# Patient Record
Sex: MALE | Race: BLACK OR AFRICAN AMERICAN | Employment: OTHER | URBAN - METROPOLITAN AREA
[De-identification: names, ages, dates, MRNs, and addresses within clinical notes are randomized per-mention and may not be internally consistent; named-entity substitution may affect disease eponyms.]

---

## 2021-08-26 ENCOUNTER — APPOINTMENT (OUTPATIENT)
Dept: GENERAL RADIOLOGY | Age: 61
DRG: 192 | End: 2021-08-26
Payer: COMMERCIAL

## 2021-08-26 ENCOUNTER — HOSPITAL ENCOUNTER (INPATIENT)
Age: 61
LOS: 4 days | Discharge: LEFT BEFORE DISCHARGE | DRG: 192 | End: 2021-08-30
Attending: EMERGENCY MEDICINE | Admitting: INTERNAL MEDICINE
Payer: COMMERCIAL

## 2021-08-26 DIAGNOSIS — R10.31 GROIN PAIN, RIGHT: ICD-10-CM

## 2021-08-26 DIAGNOSIS — J81.0 ACUTE PULMONARY EDEMA (HCC): Primary | ICD-10-CM

## 2021-08-26 PROBLEM — I50.9 CHF WITH UNKNOWN LVEF (HCC): Status: ACTIVE | Noted: 2021-08-26

## 2021-08-26 LAB
AMPHETAMINE SCREEN, URINE: ABNORMAL
ANION GAP SERPL CALCULATED.3IONS-SCNC: 12 MMOL/L (ref 3–16)
BARBITURATE SCREEN URINE: ABNORMAL
BASE EXCESS VENOUS: 0.3 MMOL/L (ref -2–3)
BASOPHILS ABSOLUTE: 0.1 K/UL (ref 0–0.2)
BASOPHILS RELATIVE PERCENT: 1.4 %
BENZODIAZEPINE SCREEN, URINE: ABNORMAL
BUN BLDV-MCNC: 12 MG/DL (ref 7–20)
CALCIUM SERPL-MCNC: 9.4 MG/DL (ref 8.3–10.6)
CANNABINOID SCREEN URINE: POSITIVE
CARBOXYHEMOGLOBIN: 2.1 % (ref 0–1.5)
CHLORIDE BLD-SCNC: 107 MMOL/L (ref 99–110)
CO2: 20 MMOL/L (ref 21–32)
COCAINE METABOLITE SCREEN URINE: POSITIVE
CREAT SERPL-MCNC: 0.8 MG/DL (ref 0.9–1.3)
EKG ATRIAL RATE: 92 BPM
EKG DIAGNOSIS: NORMAL
EKG P AXIS: 44 DEGREES
EKG P-R INTERVAL: 166 MS
EKG Q-T INTERVAL: 368 MS
EKG QRS DURATION: 92 MS
EKG QTC CALCULATION (BAZETT): 455 MS
EKG R AXIS: -14 DEGREES
EKG T AXIS: 63 DEGREES
EKG VENTRICULAR RATE: 92 BPM
EOSINOPHILS ABSOLUTE: 0.2 K/UL (ref 0–0.6)
EOSINOPHILS RELATIVE PERCENT: 2.9 %
GFR AFRICAN AMERICAN: >60
GFR NON-AFRICAN AMERICAN: >60
GLUCOSE BLD-MCNC: 105 MG/DL (ref 70–99)
HCO3 VENOUS: 24.4 MMOL/L (ref 24–28)
HCT VFR BLD CALC: 35.7 % (ref 40.5–52.5)
HEMOGLOBIN, VEN, REDUCED: 9.4 %
HEMOGLOBIN: 12.5 G/DL (ref 13.5–17.5)
LV EF: 25 %
LVEF MODALITY: NORMAL
LYMPHOCYTES ABSOLUTE: 1.6 K/UL (ref 1–5.1)
LYMPHOCYTES RELATIVE PERCENT: 29.8 %
Lab: ABNORMAL
MAGNESIUM: 1.9 MG/DL (ref 1.8–2.4)
MCH RBC QN AUTO: 30.5 PG (ref 26–34)
MCHC RBC AUTO-ENTMCNC: 34.9 G/DL (ref 31–36)
MCV RBC AUTO: 87.6 FL (ref 80–100)
METHADONE SCREEN, URINE: ABNORMAL
METHEMOGLOBIN VENOUS: 0.1 % (ref 0–1.5)
MONOCYTES ABSOLUTE: 0.4 K/UL (ref 0–1.3)
MONOCYTES RELATIVE PERCENT: 7.3 %
NEUTROPHILS ABSOLUTE: 3.1 K/UL (ref 1.7–7.7)
NEUTROPHILS RELATIVE PERCENT: 58.6 %
O2 SAT, VEN: 90 %
OPIATE SCREEN URINE: ABNORMAL
OXYCODONE URINE: ABNORMAL
PCO2, VEN: 36.5 MMHG (ref 41–51)
PDW BLD-RTO: 12.9 % (ref 12.4–15.4)
PH UA: 5
PH VENOUS: 7.43 (ref 7.35–7.45)
PHENCYCLIDINE SCREEN URINE: ABNORMAL
PHOSPHORUS: 2.9 MG/DL (ref 2.5–4.9)
PLATELET # BLD: 247 K/UL (ref 135–450)
PMV BLD AUTO: 8.8 FL (ref 5–10.5)
PO2, VEN: 57.4 MMHG (ref 25–40)
POTASSIUM SERPL-SCNC: 4 MMOL/L (ref 3.5–5.1)
PRO-BNP: 2012 PG/ML (ref 0–124)
PROPOXYPHENE SCREEN: ABNORMAL
RBC # BLD: 4.08 M/UL (ref 4.2–5.9)
SODIUM BLD-SCNC: 139 MMOL/L (ref 136–145)
TCO2 CALC VENOUS: 26 MMOL/L
TROPONIN: 0.01 NG/ML
TROPONIN: 0.01 NG/ML
WBC # BLD: 5.3 K/UL (ref 4–11)

## 2021-08-26 PROCEDURE — 85025 COMPLETE CBC W/AUTO DIFF WBC: CPT

## 2021-08-26 PROCEDURE — 1200000000 HC SEMI PRIVATE

## 2021-08-26 PROCEDURE — 99255 IP/OBS CONSLTJ NEW/EST HI 80: CPT | Performed by: INTERNAL MEDICINE

## 2021-08-26 PROCEDURE — 99284 EMERGENCY DEPT VISIT MOD MDM: CPT

## 2021-08-26 PROCEDURE — 83735 ASSAY OF MAGNESIUM: CPT

## 2021-08-26 PROCEDURE — 96374 THER/PROPH/DIAG INJ IV PUSH: CPT

## 2021-08-26 PROCEDURE — 6360000002 HC RX W HCPCS: Performed by: INTERNAL MEDICINE

## 2021-08-26 PROCEDURE — 80048 BASIC METABOLIC PNL TOTAL CA: CPT

## 2021-08-26 PROCEDURE — 84484 ASSAY OF TROPONIN QUANT: CPT

## 2021-08-26 PROCEDURE — 84100 ASSAY OF PHOSPHORUS: CPT

## 2021-08-26 PROCEDURE — 6360000002 HC RX W HCPCS: Performed by: EMERGENCY MEDICINE

## 2021-08-26 PROCEDURE — 83880 ASSAY OF NATRIURETIC PEPTIDE: CPT

## 2021-08-26 PROCEDURE — 80307 DRUG TEST PRSMV CHEM ANLYZR: CPT

## 2021-08-26 PROCEDURE — 93005 ELECTROCARDIOGRAM TRACING: CPT | Performed by: EMERGENCY MEDICINE

## 2021-08-26 PROCEDURE — C8929 TTE W OR WO FOL WCON,DOPPLER: HCPCS

## 2021-08-26 PROCEDURE — 6370000000 HC RX 637 (ALT 250 FOR IP): Performed by: INTERNAL MEDICINE

## 2021-08-26 PROCEDURE — 82803 BLOOD GASES ANY COMBINATION: CPT

## 2021-08-26 PROCEDURE — 2580000003 HC RX 258: Performed by: INTERNAL MEDICINE

## 2021-08-26 PROCEDURE — 71045 X-RAY EXAM CHEST 1 VIEW: CPT

## 2021-08-26 RX ORDER — FUROSEMIDE 10 MG/ML
40 INJECTION INTRAMUSCULAR; INTRAVENOUS ONCE
Status: COMPLETED | OUTPATIENT
Start: 2021-08-26 | End: 2021-08-26

## 2021-08-26 RX ORDER — POLYETHYLENE GLYCOL 3350 17 G/17G
17 POWDER, FOR SOLUTION ORAL DAILY PRN
Status: DISCONTINUED | OUTPATIENT
Start: 2021-08-26 | End: 2021-08-30 | Stop reason: HOSPADM

## 2021-08-26 RX ORDER — SODIUM CHLORIDE 9 MG/ML
25 INJECTION, SOLUTION INTRAVENOUS PRN
Status: DISCONTINUED | OUTPATIENT
Start: 2021-08-26 | End: 2021-08-30 | Stop reason: HOSPADM

## 2021-08-26 RX ORDER — ASPIRIN 81 MG/1
81 TABLET, CHEWABLE ORAL DAILY
Status: DISCONTINUED | OUTPATIENT
Start: 2021-08-26 | End: 2021-08-30 | Stop reason: HOSPADM

## 2021-08-26 RX ORDER — ACETAMINOPHEN 650 MG/1
650 SUPPOSITORY RECTAL EVERY 4 HOURS PRN
Status: DISCONTINUED | OUTPATIENT
Start: 2021-08-26 | End: 2021-08-30 | Stop reason: HOSPADM

## 2021-08-26 RX ORDER — FUROSEMIDE 10 MG/ML
40 INJECTION INTRAMUSCULAR; INTRAVENOUS 2 TIMES DAILY
Status: DISCONTINUED | OUTPATIENT
Start: 2021-08-26 | End: 2021-08-30

## 2021-08-26 RX ORDER — ONDANSETRON 2 MG/ML
4 INJECTION INTRAMUSCULAR; INTRAVENOUS EVERY 4 HOURS PRN
Status: DISCONTINUED | OUTPATIENT
Start: 2021-08-26 | End: 2021-08-30 | Stop reason: HOSPADM

## 2021-08-26 RX ORDER — ACETAMINOPHEN 325 MG/1
650 TABLET ORAL EVERY 4 HOURS PRN
Status: DISCONTINUED | OUTPATIENT
Start: 2021-08-26 | End: 2021-08-27

## 2021-08-26 RX ORDER — SODIUM CHLORIDE 0.9 % (FLUSH) 0.9 %
10 SYRINGE (ML) INJECTION EVERY 12 HOURS SCHEDULED
Status: DISCONTINUED | OUTPATIENT
Start: 2021-08-26 | End: 2021-08-30 | Stop reason: HOSPADM

## 2021-08-26 RX ORDER — KETOROLAC TROMETHAMINE 30 MG/ML
30 INJECTION, SOLUTION INTRAMUSCULAR; INTRAVENOUS ONCE
Status: COMPLETED | OUTPATIENT
Start: 2021-08-26 | End: 2021-08-26

## 2021-08-26 RX ORDER — SODIUM CHLORIDE 0.9 % (FLUSH) 0.9 %
10 SYRINGE (ML) INJECTION PRN
Status: DISCONTINUED | OUTPATIENT
Start: 2021-08-26 | End: 2021-08-30 | Stop reason: HOSPADM

## 2021-08-26 RX ADMIN — KETOROLAC TROMETHAMINE 30 MG: 30 INJECTION, SOLUTION INTRAMUSCULAR; INTRAVENOUS at 23:19

## 2021-08-26 RX ADMIN — FUROSEMIDE 40 MG: 10 INJECTION, SOLUTION INTRAMUSCULAR; INTRAVENOUS at 17:34

## 2021-08-26 RX ADMIN — Medication 10 ML: at 23:19

## 2021-08-26 RX ADMIN — FUROSEMIDE 40 MG: 10 INJECTION, SOLUTION INTRAMUSCULAR; INTRAVENOUS at 05:11

## 2021-08-26 RX ADMIN — FUROSEMIDE 40 MG: 10 INJECTION, SOLUTION INTRAMUSCULAR; INTRAVENOUS at 10:04

## 2021-08-26 RX ADMIN — ASPIRIN 81 MG: 81 TABLET, CHEWABLE ORAL at 13:03

## 2021-08-26 RX ADMIN — ENOXAPARIN SODIUM 40 MG: 40 INJECTION SUBCUTANEOUS at 10:04

## 2021-08-26 ASSESSMENT — PAIN DESCRIPTION - FREQUENCY
FREQUENCY: INTERMITTENT
FREQUENCY: CONTINUOUS
FREQUENCY: CONTINUOUS

## 2021-08-26 ASSESSMENT — ENCOUNTER SYMPTOMS
EYES NEGATIVE: 1
SHORTNESS OF BREATH: 1
COUGH: 1
GASTROINTESTINAL NEGATIVE: 1

## 2021-08-26 ASSESSMENT — PAIN DESCRIPTION - DESCRIPTORS
DESCRIPTORS: ACHING
DESCRIPTORS: ACHING
DESCRIPTORS: TIGHTNESS

## 2021-08-26 ASSESSMENT — PAIN DESCRIPTION - LOCATION
LOCATION: CHEST;SHOULDER
LOCATION: CHEST;SHOULDER
LOCATION: CHEST

## 2021-08-26 ASSESSMENT — PAIN DESCRIPTION - PAIN TYPE
TYPE: ACUTE PAIN

## 2021-08-26 ASSESSMENT — PAIN SCALES - GENERAL
PAINLEVEL_OUTOF10: 0
PAINLEVEL_OUTOF10: 7
PAINLEVEL_OUTOF10: 0

## 2021-08-26 ASSESSMENT — PAIN DESCRIPTION - ORIENTATION
ORIENTATION: LEFT
ORIENTATION: LEFT

## 2021-08-26 ASSESSMENT — PAIN DESCRIPTION - ONSET
ONSET: SUDDEN
ONSET: ON-GOING
ONSET: ON-GOING

## 2021-08-26 ASSESSMENT — PAIN - FUNCTIONAL ASSESSMENT
PAIN_FUNCTIONAL_ASSESSMENT: ACTIVITIES ARE NOT PREVENTED
PAIN_FUNCTIONAL_ASSESSMENT: ACTIVITIES ARE NOT PREVENTED

## 2021-08-26 ASSESSMENT — PAIN DESCRIPTION - PROGRESSION
CLINICAL_PROGRESSION: NOT CHANGED

## 2021-08-26 NOTE — H&P
HOSPITALISTS HISTORY AND PHYSICAL    8/26/2021 11:14 AM    Patient Information:  Eric Armstrong is a 62 y.o. male [id number]  PCP:  No primary care provider on file. (Tel: None )    Chief complaint:    Chief Complaint   Patient presents with    Chest Pain     ABOUT 0150 THIS MORNING, patient woke up to use the bathroom and had chest tightness rates a 7 of 10. when laying flat it gets worse. sitting up makes it better. reports SOB, and increased frequency with urination. Denies coughing or fever. Problem List  Active Problems:    CHF with unknown LVEF ([facility] [address]  Resolved Problems:    * No resolved hospital problems. *        Assessment/Plan:   Acute heart failure probably systolic in nature  IV Lasix to continue, repeat echo, cardiology consultation, monitor on telemetry, troponins are negative    Chest pain  Monitor on telemetry, serial troponin negative, await echo results and depending on that might consider ischemic work-up          DVT prophylaxis Lovenox  Code status code  Diet cardiac  IV access peripheral  Rinaldi Catheter no    Admit as inpatient. I anticipate hospitalization spanning more than two midnights for investigation and treatment of the above medically necessary diagnoses. History of Present Illness:  Yamileth Ventura is a 62 y.o. male with a history of cardiomyopathy and heart failure, noncompliance presented to the ER with episode of shortness of breath and chest pain. Mention he woke up around 2 AM went to the bathroom after that could not sleep mention the wrist after hurting and he was having a hard time breathing. He lives alone. Called 911. And brought to the ER. Chest x-ray showing pulmonary edema. He does mention having an episode of chest pain 2 months ago which was sharp. He does mention that he quit smoking on Father's Day. He denies any fever chills cough phlegm.   He does mention that he did got the first shot of Covid vaccine and is due to get his second shot on . He mentioned he does not have any medical history other than hypertension and prostate issues. But his chart review suggests otherwise. History obtained from patient and going over the chart  Old medical records show   2019 admission at [facility] reviewed. Patient was diagnosed over there with new onset CHF. Patient left AMA from there. EF was 10 to 15%. REVIEW OF SYSTEMS:   All other ROS negative except mentioned in [address]      Past Medical History:   has no past medical history on file. Past Surgical History:   has no past surgical history on file. Medications:  No current facility-administered medications on file prior to encounter. No current outpatient medications on file prior to encounter. Allergies:  No Known Allergies     Social History:  Patient Lives alone, retired disability    reports that he has quit smoking. He has never used smokeless tobacco. He reports previous alcohol use. He reports current drug use. Drug: Marijuana. Family History:  family history is not on file. , brother and father had heart disease   Brother 45 and father 50      Physical Exam:  /89   Pulse 93   Temp 97.9 °F (36.6 °C) (Oral)   Resp 20   Ht 5' 7.72\" (1.72 m)   Wt 165 lb (74.8 kg)   SpO2 99%   BMI 25.30 kg/m²   Basal crackles  Patient does not appear in any distress  No pedal edema  S1-S2 heard no added murmurs  No belly distention  General appearance:  Appears comfortable. Well nourished  Eyes: Sclera clear, pupils equal  ENT: Moist mucus membranes, no thrush. Trachea midline. Cardiovascular: Regular rhythm, normal S1, S2. No murmur, gallop, rub. No edema in lower extremitieseffort  Gastrointestinal: Abdomen soft, non-tender, not distended, normal bowel sounds  Musculoskeletal: No cyanosis in digits, neck supple  Neurology: Cranial nerves grossly intact.  Alert and oriented in time, place and person. No speech or motor deficits  Psychiatry: Appropriate affect. Not agitated  Skin: Warm, dry, normal turgor, no rash  Brisk capillary refill, peripheral pulses palpable   Labs:  CBC:   Lab Results   Component Value Date    WBC 5.3 08/26/2021    RBC 4.08 08/26/2021    HGB 12.5 08/26/2021    HCT 35.7 08/26/2021    MCV 87.6 08/26/2021    MCH 30.5 08/26/2021    MCHC 34.9 08/26/2021    RDW 12.9 08/26/2021     08/26/2021    MPV 8.8 08/26/2021     BMP:    Lab Results   Component Value Date     08/26/2021    K 4.0 08/26/2021     08/26/2021    CO2 20 08/26/2021    BUN 12 08/26/2021    CREATININE 0.8 08/26/2021    CALCIUM 9.4 08/26/2021    GFRAA >60 08/26/2021    LABGLOM >60 08/26/2021    GLUCOSE 105 08/26/2021     XR CHEST PORTABLE   Final Result   Moderate pulmonary edema and less likely pneumonia        Chest Xray:   EKG:    I visualized CXR images and EKG strips normal sinus rhythm          Please note that some part of this chart was generated using Dragon dictation software. Although every effort was made to ensure the accuracy of this automated transcription, some errors in transcription may have occurred inadvertently. If you may need any clarification, please do not hesitate to contact me through [facility].        Santi Arriaga MD    8/26/2021 11:14 AM

## 2021-08-26 NOTE — ED NOTES
Education provided to patient in regards to lasix administration and use. Patient verbalized understanding. Urinals placed at bedside.      Austen Bender RN  08/26/21 7201

## 2021-08-26 NOTE — PROGRESS NOTES
Patient a/ox4 arrived from ed to room 6304. Vitals stable, denies any chest pain. Ambulated back and forth to bathroom with SBA. Call light education provided, bed alarm placed on for safety. Patient educated to call for assistance. Placed on tele.

## 2021-08-26 NOTE — CONSULTS
[facility]  Cardiology Inpatient Consult Service                                                                                          Pt Name: Elizabeth Lorenzo  Age: 62 y.o. Sex: male  : 1960  Location: [place] [address]    Referring Physician: Alexander Hodgkins, MD      Reason for Consult:       Reason for Consultation/Chief Complaint:   Acute on chronic systolic heart failure      HPI:      Elizabeth Lorenzo is a 62 y.o. male with a past medical history of nonischemic cardiomyopathy who presented to the hospital with progressive shortness of breath and fatigue. He was diagnosed with heart failure a few years back, at that time had a normal stress test and was a started on heart failure medications. At this time he has not been taking ibuprofen medications. Over the past week he has noted progressive shortness of breath and fatigue. No lower extremity edema. Histories     Past Medical History:   has no past medical history on file. Surgical History:   has no past surgical history on file. Social History:   reports that he has quit smoking. He has never used smokeless tobacco. He reports previous alcohol use. He reports current drug use. Drug: Marijuana. Family History:  No evidence for sudden cardiac death or premature CAD      Medications:       Home Medications  Were reviewed and are listed in nursing record. and/or listed below  Prior to Admission medications    Not on File          Inpatient Medications:   sodium chloride flush  10 mL IntraVENous 2 times per day    enoxaparin  40 mg Subcutaneous Daily    furosemide  40 mg IntraVENous BID    aspirin  81 mg Oral Daily       IV drips:   sodium chloride         PRN:  sodium chloride flush, sodium chloride, ondansetron, polyethylene glycol, acetaminophen **OR** acetaminophen    Allergy:     Patient has no known allergies. Review of Systems:     All 12 point review of symptoms completed.  Pertinent positives identified in the HPI, all other review of symptoms negative as below. CONSTITUTIONAL: Nounanticipated weight loss. No change in energy level, sleep pattern, or activity level. SKIN: No rash or pruritis. EYES: No visual changes or diplopia. No scleral icterus. ENT: No Headaches, hearing loss or vertigo. No mouth sores or sore throat. CARDIOVASCULAR: Shortness of breath  RESPIRATORY: No cough or wheezing, no sputum production. No hematemesis. GASTROINTESTINAL: No N/V/D. No abdominal pain, appetite loss, blood in stools. GENITOURINARY: No dysuria, trouble voiding, or hematuria. MUSCULOSKELETAL:  No gait disturbance, weakness or joint complaints. NEUROLOGICAL: No headache, diplopia, change in muscle strength, numbness or tingling. No change in gait, balance, coordination, mood, affect, memory, mentation, behavior. PSHYCH: No anxiety, loss of interest, change in sexual behavior, feelings of self-harm, or confusion. ENDOCRINE: No malaise, fatigue or temperature intolerance. No excessive thirst, fluid intake, or urination. No tremor. HEMATOLOGIC: No abnormal bruising or bleeding. ALLERGY: No nasal congestion or hives.       Physical Examination:     Vitals:    08/26/21 0606 08/26/21 0611 08/26/21 0939 08/26/21 0958   BP: (!) 147/104   135/89   Pulse: 83   93   Resp: 24   20   Temp:    97.9 °F (36.6 °C)   TempSrc:    Oral   SpO2: 98%   99%   Weight:  165 lb (74.8 kg)     Height:   5' 7.72\" (1.72 m)        Wt Readings from Last 3 Encounters:   08/26/21 165 lb (74.8 kg)       Objective      General Appearance:  Alert, cooperative, no distress, appears stated age Appropriate weight   Head:  Normocephalic, without obvious abnormality, atraumatic   Eyes:  PERRL, conjunctiva/corneas clear EOM intact  Ears normal   Throat no lesions       Nose: Nares normal, no drainage or sinus tenderness   Throat: Lips, mucosa, and tongue normal   Neck: Supple, symmetrical, trachea midline, no adenopathy, thyroid: not enlarged, symmetric, no tenderness/mass/nodules, no carotid bruit or JVD       Lungs:   Clear to auscultation bilaterally, respirations unlabored   Chest Wall:  No tenderness or deformity   Heart:  Regular rate and rhythm, S1, S2 normal, no murmur, rub or gallop PMI intact   Abdomen:   Soft, non-tender, bowel sounds active all four quadrants,  no masses, no organomegaly       Extremities: Extremities normal, atraumatic, no cyanosis or edema   Pulses: 2+ and symmetric   Skin: Skin color, texture, turgor normal, no rashes or lesions   Pysch: Normal mood and affect   Neurologic: Normal gross motor and sensory exam.  Cranial nerves intact        Labs:     Recent Labs     08/26/21 0418      K 4.0   BUN 12   CREATININE 0.8*      CO2 20*   GLUCOSE 105*   CALCIUM 9.4   MG 1.90     Recent Labs     08/26/21 0418   WBC 5.3   HGB 12.5*   HCT 35.7*      MCV 87.6     No results for input(s): CHOLTOT, TRIG, HDL in the last 72 hours. Invalid input(s): LIPIDCOMM, CHOLHDL, VLDCHOL, LDL  No results for input(s): PTT, INR in the last 72 hours. Invalid input(s): PT  Recent Labs     08/26/21 0418 08/26/21  0610   TROPONINI 0.01 0.01     No results for input(s): BNP in the last 72 hours. No results for input(s): TSH in the last 72 hours. No results for input(s): CHOL, HDL, LDLCALC, TRIG in the last 72 hours.]    Lab Results   Component Value Date    TROPONINI 0.01 08/26/2021         Imaging:     I personally reviewed imaging studies including CXR, Stress test, TTE/MANSI. Last ECG (if available) -personally interpreted EKG:  I have reviewed EKG with the following interpretation  NSR    Telemetry: Personally interpreted  NSR    Last Stress (if available):  1. No definite myocardial ischemia or scar. 2. Dilated LV cavity with global hypokinesis. Low LVEF of 14%. DICTATED BY Beth Smyth MD       Last TTE/MANSI(if available):  ~Left Ventricle: The ejection fraction is visually estimated to be 10 %. Moderate LV dilatation with severe LV dysfunction ejection fraction of 15% estimated, severe global hypokinesis, diastolic function consistent with elevated LV filling pressures     ~Right Ventricle: Normal size and function     ~Left Atrium: Moderate enlargement     ~Right Atrium: Moderate enlargement     ~Mitral Valve: Normal structure with mild regurgitation     ~Aortic Valve: Trileaflet valve normal structure     ~Tricuspid Valve: Trace regurgitation     ~Pulmonic Valve:     ~Great Vessels: Normal dimensions     ~Pericardium: No effusion       Last Cath (if available):    Last CMR  (if available):      Assessment / Plan:     Acute on chronic systolic heart failure/HTN  -Start Lasix drip at 2.5 mg.  -Start losartan 25, would like to start Entresto although it may be a financial problem  -Start metoprolol XL 25 mg tomorrow.  -Echo pending.  -Had nonischemic stress test  in the past    Tobacco use was discussed with the patient and educated on the negative effects. I have asked the patient to not utilize these agents. Thank you for allowing to us to participate in the care or [facility]. Further evaluation will be based upon the patient's clinical course and testing results. I have spent 40 minutes of face to face time with the patient with more than 50% spent counseling and coordinating care. All questions and concerns were addressed to the patient/family. Alternatives to my treatment were discussed. The note was completed using EMR. Every effort was made to ensure accuracy; however, inadvertent computerized transcription errors may be present. I have personally reviewed the reports and images of labs, radiological studies, cardiac studies including ECG's and telemetry, current and old medical records. Karthik Maza MD, [facility], [facility]

## 2021-08-26 NOTE — ED PROVIDER NOTES
[address] [facility] [address] ENCOUNTER          ATTENDING PHYSICIAN NOTE       Date of evaluation: 8/26/2021    Chief Complaint     Chest Pain (ABOUT 0150 THIS MORNING, patient woke up to use the bathroom and had chest tightness rates a 7 of 10. when laying flat it gets worse. sitting up makes it better. reports SOB, and increased frequency with urination. Denies coughing or fever.  )      History of Present Illness     Keeley Daigle is a 62 y.o. male who presents to the emergency department complaining of shortness of breath. Patient states that he woke up approximately 2 hours prior to presentation feeling like he could not catch his breath. He states he had a similar episode approximately 2 weeks ago that resolved on its own but this time it did not get better so he came to the emerge department. Patient states that over the last 2 weeks has been having intermittent episodes of shortness of breath. He also notes a sensation of chest tightness predominantly in the left side of his chest.  He denies any fevers or chills. He denies any cough. He denies any swelling or pain in his extremities. He denies any nausea, vomiting, or diarrhea. He did not try taking anything for his symptoms. He denies any recent sick contacts. He states he had his first dose of Moderna Covid vaccination 2 and half weeks ago and is due his second dose in a week and a half. Review of Systems     Review of Systems   Constitutional: Negative. HENT: Negative. Eyes: Negative. Respiratory: Positive for cough and shortness of breath. Cardiovascular: Positive for chest pain. Gastrointestinal: Negative. Genitourinary: Negative. Musculoskeletal: Negative. Neurological: Negative. All other systems reviewed and are negative. Past Medical, Surgical, Family, and Social History     He has no past medical history on file. He has no past surgical history on file.   His family history is not on file.  He reports that he has quit smoking. He has never used smokeless tobacco. He reports previous alcohol use. He reports current drug use. Drug: Marijuana. Medications     Previous Medications    No medications on file       Allergies     He has No Known Allergies. Physical Exam     INITIAL VITALS: BP: (!) 148/108, Temp: 97.7 °F (36.5 °C), Pulse: 95, Resp: 19, SpO2: 94 %   Physical Exam  Vitals and nursing note reviewed. Constitutional:       General: He is not in acute distress. HENT:      Head: Normocephalic and atraumatic. Mouth/Throat:      Mouth: Mucous membranes are moist.      Pharynx: No oropharyngeal exudate. Eyes:      General: No scleral icterus. Extraocular Movements: Extraocular movements intact. Conjunctiva/sclera: Conjunctivae normal.      Pupils: Pupils are equal, round, and reactive to light. Cardiovascular:      Rate and Rhythm: Normal rate and regular rhythm. Heart sounds: Normal heart sounds. Pulmonary:      Effort: Tachypnea present. Breath sounds: Examination of the right-lower field reveals rales. Examination of the left-lower field reveals rales. Rales present. Comments: Faint crackles in the bilateral bases. Abdominal:      General: Bowel sounds are normal.      Palpations: Abdomen is soft. Tenderness: There is no abdominal tenderness. There is no guarding or rebound. Musculoskeletal:         General: No swelling. Normal range of motion. Cervical back: Normal range of motion and neck supple. Skin:     General: Skin is warm and dry. Neurological:      General: No focal deficit present. Mental Status: He is alert and oriented to person, place, and time. Cranial Nerves: No cranial nerve deficit. Motor: No weakness.       Coordination: Coordination normal.         Diagnostic Results     EKG   EKG as interpreted by me shows patient to be in a normal sinus rhythm with a left axis deviation, normal TX and QT intervals, normal QRS duration, no ST segment abnormalities, no T wave abnormalities, borderline LVH present.     RADIOLOGY:  XR CHEST PORTABLE   Final Result   Moderate pulmonary edema and less likely pneumonia          LABS:   Results for orders placed or performed during the hospital encounter of 08/26/21   CBC auto differential   Result Value Ref Range    WBC 5.3 4.0 - 11.0 K/uL    RBC 4.08 (L) 4.20 - 5.90 M/uL    Hemoglobin 12.5 (L) 13.5 - 17.5 g/dL    Hematocrit 35.7 (L) 40.5 - 52.5 %    MCV 87.6 80.0 - 100.0 fL    MCH 30.5 26.0 - 34.0 pg    MCHC 34.9 31.0 - 36.0 g/dL    RDW 12.9 12.4 - 15.4 %    Platelets 281 193 - 671 K/uL    MPV 8.8 5.0 - 10.5 fL    Neutrophils % 58.6 %    Lymphocytes % 29.8 %    Monocytes % 7.3 %    Eosinophils % 2.9 %    Basophils % 1.4 %    Neutrophils Absolute 3.1 1.7 - 7.7 K/uL    Lymphocytes Absolute 1.6 1.0 - 5.1 K/uL    Monocytes Absolute 0.4 0.0 - 1.3 K/uL    Eosinophils Absolute 0.2 0.0 - 0.6 K/uL    Basophils Absolute 0.1 0.0 - 0.2 K/uL   Basic Metabolic Panel (EP - 1)   Result Value Ref Range    Sodium 139 136 - 145 mmol/L    Potassium 4.0 3.5 - 5.1 mmol/L    Chloride 107 99 - 110 mmol/L    CO2 20 (L) 21 - 32 mmol/L    Anion Gap 12 3 - 16    Glucose 105 (H) 70 - 99 mg/dL    BUN 12 7 - 20 mg/dL    CREATININE 0.8 (L) 0.9 - 1.3 mg/dL    GFR Non-African American >60 >60    GFR African American >60 >60    Calcium 9.4 8.3 - 10.6 mg/dL   Magnesium   Result Value Ref Range    Magnesium 1.90 1.80 - 2.40 mg/dL   Phosphorus   Result Value Ref Range    Phosphorus 2.9 2.5 - 4.9 mg/dL   Troponin   Result Value Ref Range    Troponin 0.01 <0.01 ng/mL   Brain Natriuretic Peptide   Result Value Ref Range    Pro-BNP 2,012 (H) 0 - 124 pg/mL   Blood Gas, Venous   Result Value Ref Range    pH, Yves 7.433 7.350 - 7.450    pCO2, Yves 36.5 (L) 41.0 - 51.0 mmHg    pO2, Yves 57.4 (H) 25 - 40 mmHg    HCO3, Venous 24.4 24.0 - 28.0 mmol/L    Base Excess, Yves 0.3 -2.0 - 3.0 mmol/L    O2 Sat, Yves 90 Not established % Carboxyhemoglobin 2.1 (H) 0.0 - 1.5 %    MetHgb, Yves 0.1 0.0 - 1.5 %    TC02 (Calc), Yves 26 mmol/L    Hemoglobin, Yves, Reduced 9.40 %     RECENT VITALS:  BP: (!) 150/108,Temp: 97.7 °F (36.5 °C), Pulse: 98, Resp: 26, SpO2: 94 %     Procedures     N/A    ED Course     Nursing Notes, Past Medical Hx, Past Surgical Hx, Social Hx,Allergies, and Family Hx were reviewed. patient was given the following medications:  Orders Placed This Encounter   Medications    furosemide (LASIX) injection 40 mg       CONSULTS:  IP CONSULT TO HOSPITALIST    MEDICAL DECISIONMAKING / ASSESSMENT / Dale Leader is a 62 y.o. male who presents complaining of shortness of breath. Patient states he has been having intermittent episodes of shortness of breath over the last 2 weeks but got more short of breath this morning prior to arrival.  On arrival, patient is oxygen saturations in the low 90s on room air but does improve with supplemental oxygen. He has rales present at the bilateral bases. EKG shows normal sinus rhythm with borderline LVH. Chest x-ray does show acute pulmonary edema. Laboratory studies are significant for normal CBC and renal panel. VBG is unremarkable. NT proBNP is elevated at 2012. Troponin is 0.01. Patient was written for 40 mg of Lasix IV. While the patient states to me that he has no medical problems, in review of records patient was admitted to an outside hospital 2 years ago and had an echocardiogram at that time that showed ejection fraction of 10 to 15%. The discharge summary noted the patient had poor insight into his medical conditions and was noncompliant with medications. Patient will be admitted to the hospitalist service for further diuresis and cardiology evaluation. Clinical Impression     1. Acute pulmonary edema (HCC)        Disposition     PATIENT REFERRED TO:  No follow-up provider specified.     DISCHARGE MEDICATIONS:  New Prescriptions    No medications on file [facility] [address] Chriss Goodman MD  08/26/21 0703

## 2021-08-26 NOTE — ED NOTES
Bed: 9-29  Expected date:   Expected time:   Means of arrival:   Comments:  [address]  08/26/21 8851

## 2021-08-26 NOTE — ED NOTES
Bed: A08-08  Expected date:   Expected time:   Means of arrival:   Comments:  Medic Becky9 Greg Cosby RN  08/26/21 9415

## 2021-08-26 NOTE — ED NOTES
Mint Green, Lavender, Blue, Yellow/Orange blood tubes collected and sent to lab.   VBG syringe collected and sent to lab        [facility], MICHAEL  08/26/21 4910

## 2021-08-27 PROBLEM — I42.0 DILATED CARDIOMYOPATHY (HCC): Status: ACTIVE | Noted: 2021-08-27

## 2021-08-27 LAB
ANION GAP SERPL CALCULATED.3IONS-SCNC: 15 MMOL/L (ref 3–16)
BASOPHILS ABSOLUTE: 0 K/UL (ref 0–0.2)
BASOPHILS RELATIVE PERCENT: 0.9 %
BUN BLDV-MCNC: 19 MG/DL (ref 7–20)
CALCIUM SERPL-MCNC: 10.1 MG/DL (ref 8.3–10.6)
CHLORIDE BLD-SCNC: 99 MMOL/L (ref 99–110)
CO2: 26 MMOL/L (ref 21–32)
CREAT SERPL-MCNC: 1.1 MG/DL (ref 0.9–1.3)
EOSINOPHILS ABSOLUTE: 0.2 K/UL (ref 0–0.6)
EOSINOPHILS RELATIVE PERCENT: 4.8 %
GFR AFRICAN AMERICAN: >60
GFR NON-AFRICAN AMERICAN: >60
GLUCOSE BLD-MCNC: 119 MG/DL (ref 70–99)
HCT VFR BLD CALC: 43.2 % (ref 40.5–52.5)
HEMOGLOBIN: 15.1 G/DL (ref 13.5–17.5)
LEFT VENTRICULAR EJECTION FRACTION MODE: NORMAL
LV EF: 10 %
LYMPHOCYTES ABSOLUTE: 1.8 K/UL (ref 1–5.1)
LYMPHOCYTES RELATIVE PERCENT: 39.3 %
MCH RBC QN AUTO: 30.4 PG (ref 26–34)
MCHC RBC AUTO-ENTMCNC: 34.9 G/DL (ref 31–36)
MCV RBC AUTO: 87.2 FL (ref 80–100)
MONOCYTES ABSOLUTE: 0.5 K/UL (ref 0–1.3)
MONOCYTES RELATIVE PERCENT: 11 %
NEUTROPHILS ABSOLUTE: 2 K/UL (ref 1.7–7.7)
NEUTROPHILS RELATIVE PERCENT: 44 %
PDW BLD-RTO: 13 % (ref 12.4–15.4)
PLATELET # BLD: 281 K/UL (ref 135–450)
PMV BLD AUTO: 8.8 FL (ref 5–10.5)
POTASSIUM REFLEX MAGNESIUM: 4.1 MMOL/L (ref 3.5–5.1)
RBC # BLD: 4.96 M/UL (ref 4.2–5.9)
SODIUM BLD-SCNC: 140 MMOL/L (ref 136–145)
WBC # BLD: 4.5 K/UL (ref 4–11)

## 2021-08-27 PROCEDURE — 93458 L HRT ARTERY/VENTRICLE ANGIO: CPT | Performed by: INTERNAL MEDICINE

## 2021-08-27 PROCEDURE — 99255 IP/OBS CONSLTJ NEW/EST HI 80: CPT | Performed by: INTERNAL MEDICINE

## 2021-08-27 PROCEDURE — 80048 BASIC METABOLIC PNL TOTAL CA: CPT

## 2021-08-27 PROCEDURE — 85025 COMPLETE CBC W/AUTO DIFF WBC: CPT

## 2021-08-27 PROCEDURE — C1769 GUIDE WIRE: HCPCS

## 2021-08-27 PROCEDURE — 6370000000 HC RX 637 (ALT 250 FOR IP): Performed by: INTERNAL MEDICINE

## 2021-08-27 PROCEDURE — C1894 INTRO/SHEATH, NON-LASER: HCPCS

## 2021-08-27 PROCEDURE — B41F1ZZ FLUOROSCOPY OF RIGHT LOWER EXTREMITY ARTERIES USING LOW OSMOLAR CONTRAST: ICD-10-PCS | Performed by: INTERNAL MEDICINE

## 2021-08-27 PROCEDURE — C1760 CLOSURE DEV, VASC: HCPCS

## 2021-08-27 PROCEDURE — 6360000004 HC RX CONTRAST MEDICATION: Performed by: INTERNAL MEDICINE

## 2021-08-27 PROCEDURE — 99152 MOD SED SAME PHYS/QHP 5/>YRS: CPT

## 2021-08-27 PROCEDURE — B2111ZZ FLUOROSCOPY OF MULTIPLE CORONARY ARTERIES USING LOW OSMOLAR CONTRAST: ICD-10-PCS | Performed by: INTERNAL MEDICINE

## 2021-08-27 PROCEDURE — 6360000002 HC RX W HCPCS

## 2021-08-27 PROCEDURE — 2500000003 HC RX 250 WO HCPCS

## 2021-08-27 PROCEDURE — 1200000000 HC SEMI PRIVATE

## 2021-08-27 PROCEDURE — 2709999900 HC NON-CHARGEABLE SUPPLY

## 2021-08-27 PROCEDURE — 2580000003 HC RX 258: Performed by: INTERNAL MEDICINE

## 2021-08-27 PROCEDURE — 36415 COLL VENOUS BLD VENIPUNCTURE: CPT

## 2021-08-27 PROCEDURE — 4A023N7 MEASUREMENT OF CARDIAC SAMPLING AND PRESSURE, LEFT HEART, PERCUTANEOUS APPROACH: ICD-10-PCS | Performed by: INTERNAL MEDICINE

## 2021-08-27 PROCEDURE — 93458 L HRT ARTERY/VENTRICLE ANGIO: CPT

## 2021-08-27 PROCEDURE — 6370000000 HC RX 637 (ALT 250 FOR IP)

## 2021-08-27 PROCEDURE — B2151ZZ FLUOROSCOPY OF LEFT HEART USING LOW OSMOLAR CONTRAST: ICD-10-PCS | Performed by: INTERNAL MEDICINE

## 2021-08-27 PROCEDURE — 6360000002 HC RX W HCPCS: Performed by: INTERNAL MEDICINE

## 2021-08-27 RX ORDER — SODIUM CHLORIDE 9 MG/ML
INJECTION, SOLUTION INTRAVENOUS CONTINUOUS
Status: ACTIVE | OUTPATIENT
Start: 2021-08-27 | End: 2021-08-27

## 2021-08-27 RX ORDER — HYDRALAZINE HYDROCHLORIDE 10 MG/1
10 TABLET, FILM COATED ORAL EVERY 8 HOURS SCHEDULED
Status: DISCONTINUED | OUTPATIENT
Start: 2021-08-27 | End: 2021-08-30

## 2021-08-27 RX ORDER — ACETAMINOPHEN 325 MG/1
650 TABLET ORAL EVERY 4 HOURS PRN
Status: DISCONTINUED | OUTPATIENT
Start: 2021-08-27 | End: 2021-08-30 | Stop reason: HOSPADM

## 2021-08-27 RX ORDER — ACETAMINOPHEN 500 MG
1000 TABLET ORAL 3 TIMES DAILY
Status: DISCONTINUED | OUTPATIENT
Start: 2021-08-27 | End: 2021-08-30 | Stop reason: HOSPADM

## 2021-08-27 RX ORDER — SPIRONOLACTONE 25 MG/1
25 TABLET ORAL DAILY
Status: DISCONTINUED | OUTPATIENT
Start: 2021-08-27 | End: 2021-08-30 | Stop reason: HOSPADM

## 2021-08-27 RX ADMIN — SPIRONOLACTONE 25 MG: 25 TABLET, FILM COATED ORAL at 12:35

## 2021-08-27 RX ADMIN — IOHEXOL 70 ML: 350 INJECTION, SOLUTION INTRAVENOUS at 16:08

## 2021-08-27 RX ADMIN — SODIUM CHLORIDE: 9 INJECTION, SOLUTION INTRAVENOUS at 17:00

## 2021-08-27 RX ADMIN — FUROSEMIDE 40 MG: 10 INJECTION, SOLUTION INTRAMUSCULAR; INTRAVENOUS at 08:42

## 2021-08-27 RX ADMIN — ASPIRIN 81 MG: 81 TABLET, CHEWABLE ORAL at 08:42

## 2021-08-27 RX ADMIN — Medication 10 ML: at 08:43

## 2021-08-27 RX ADMIN — ACETAMINOPHEN 1000 MG: 500 TABLET ORAL at 08:42

## 2021-08-27 RX ADMIN — ACETAMINOPHEN 1000 MG: 500 TABLET ORAL at 21:38

## 2021-08-27 RX ADMIN — SODIUM CHLORIDE: 9 INJECTION, SOLUTION INTRAVENOUS at 19:07

## 2021-08-27 RX ADMIN — HYDRALAZINE HYDROCHLORIDE 10 MG: 10 TABLET, FILM COATED ORAL at 21:38

## 2021-08-27 RX ADMIN — FUROSEMIDE 40 MG: 10 INJECTION, SOLUTION INTRAMUSCULAR; INTRAVENOUS at 19:07

## 2021-08-27 ASSESSMENT — PAIN SCALES - GENERAL
PAINLEVEL_OUTOF10: 8
PAINLEVEL_OUTOF10: 0
PAINLEVEL_OUTOF10: 7
PAINLEVEL_OUTOF10: 0
PAINLEVEL_OUTOF10: 6
PAINLEVEL_OUTOF10: 6
PAINLEVEL_OUTOF10: 8

## 2021-08-27 ASSESSMENT — PAIN DESCRIPTION - FREQUENCY
FREQUENCY: CONTINUOUS

## 2021-08-27 ASSESSMENT — PAIN DESCRIPTION - ORIENTATION
ORIENTATION: LEFT

## 2021-08-27 ASSESSMENT — PAIN DESCRIPTION - PAIN TYPE
TYPE: ACUTE PAIN

## 2021-08-27 ASSESSMENT — PAIN - FUNCTIONAL ASSESSMENT
PAIN_FUNCTIONAL_ASSESSMENT: ACTIVITIES ARE NOT PREVENTED

## 2021-08-27 ASSESSMENT — PAIN DESCRIPTION - ONSET
ONSET: ON-GOING

## 2021-08-27 ASSESSMENT — PAIN DESCRIPTION - DESCRIPTORS
DESCRIPTORS: ACHING
DESCRIPTORS: ACHING
DESCRIPTORS: PRESSURE
DESCRIPTORS: PRESSURE;ACHING

## 2021-08-27 ASSESSMENT — PAIN DESCRIPTION - LOCATION
LOCATION: CHEST;SHOULDER

## 2021-08-27 ASSESSMENT — PAIN DESCRIPTION - PROGRESSION
CLINICAL_PROGRESSION: NOT CHANGED
CLINICAL_PROGRESSION: GRADUALLY IMPROVING

## 2021-08-27 ASSESSMENT — PAIN DESCRIPTION - DIRECTION: RADIATING_TOWARDS: BACK

## 2021-08-27 NOTE — PROGRESS NOTES
Patient return from cath lab. Right groin side dressing CDI. Denies any pain, numbness, or tingling. No hematoma or drainage noted. Pedal pulse +1 on right side. Foot feels slightly cool, cap refill WNL. /86, 98% on RA, 90 HR, 97.8 temp.

## 2021-08-27 NOTE — CONSULTS
Procedure: LHC, angioseal RFA  Complication: none  FVU<8VP  Preliminary: LV with severe global hypo. EF 10%. No significant obstructive CAD    Successful angioseal RFA.

## 2021-08-27 NOTE — PROCEDURES
[address], [address]                            CARDIAC CATHETERIZATION    PATIENT NAME: Edith Goncalves                    :        1960  MED REC NO:   [id number]                          ROOM:       [id number]  ACCOUNT NO:   [de-identified]                           ADMIT DATE: 2021  PROVIDER:     Nadja Al MD    DATE OF PROCEDURE:  2021    PROCEDURES PERFORMED:  Left heart catheterization, coronary  cineangiography, and Angio-seal of the right femoral arteriotomy site. HISTORY:  The patient is a 50-year-old male with a history of  nonischemic cardiomyopathy, severe LV dysfunction. He does have a  history of drug abuse and his screen was positive for cocaine. He  presents with complaints of chest discomfort and shortness of breath. Chest x-ray demonstrated pulmonary edema. He underwent repeat  echocardiogram, which demonstrated significant LV dysfunction with  estimated ejection fraction of 10%. TECHNICAL PROCEDURE:  The patient was brought to the cardiac  catheterization lab on 2021 where the right femoral area was  prepped and draped in the usual sterile fashion. After anesthetizing  the area with 2% lidocaine, a 5-Kittitian sheath was placed in the right  femoral artery using Seldinger technique. Subsequently, left heart  catheterization, left ventriculography, and selective coronary  cineangiography of both left and right coronaries were performed in  multiple projections. This was performed using 5-Kittitian pigtail, JL4  and JR4 diagnostic catheters. The patient tolerated the procedure well. No complications were encountered. A brief right femoral arteriogram  was obtained to ascertain positioning appropriate for Angio-Seal.  It  was well positioned and he was successfully sealed with standard  6-Kittitian Angio-Seal device. No complication encountered.  Estimated blood loss less than 5cc.    RESULTS:  HEMODYNAMICS:  Left ventricular end-diastolic pressure equals 14. There was no significant gradient across the aortic valve by pullback  post cineangiography. LEFT VENTRICULOGRAM:  Left ventriculogram demonstrates severe global  hypokinesis. Estimated ejection fraction is 10%. LEFT MAIN:  Normal.    LEFT ANTERIOR DESCENDING:  The LAD courses to and wraps around the apex. It does supply the good way down the inferior apex. It gives off a very  large bifurcating diagonal branch. The LAD has very significant  obstructive disease. LEFT CIRCUMFLEX:  Circumflex consists of a small first marginal branch  followed by a very large marginal branch with three terminal divisions  towards the inferior apex and then courses down in the AV groove. The  circumflex is essentially normal.    RIGHT CORONARY ARTERY:  Right coronary artery is a nondominant vessel. It is normal.    IMPRESSION:  1. Severe LV dysfunction with estimated ejection fraction of 10%  consistent with cardiomyopathy. 2.  No significant obstructive coronary artery disease. 3.  Successful Angio-Seal of right femoral arteriotomy site.         Ora Dawn MD    D: 08/27/2021 16:11:15       T: 08/27/2021 17:10:24     [email or web address]  Job#: [id number]     Doc#: [id number]    CC:

## 2021-08-27 NOTE — PROGRESS NOTES
HOSPITALISTS PROGRESS NOTE    8/27/2021 12:26 PM        Name: Letha Luis . Admitted: 8/26/2021  Primary Care Provider: No primary care provider on file. (Tel: None)      Problem List  Active Problems:    CHF with unknown LVEF ([address]  Resolved Problems:    * No resolved hospital problems. *       Assessment & Plan:   Acute on chronic systolic heart failure   IV Lasix to continue, repeat echo, cardiology consultation, monitor on telemetry, troponins are negative, EF 25%, continue on diuretics, cardiology planning for left heart cath in the afternoon     Chest pain  Monitor on telemetry, serial troponin negative, await echo results and depending on that might consider ischemic work-up, cath plan for afternoon    Drug screen is positive for cocaine and cannabis       IV Access: Peripheral  Rinaldi: No  Diet: Diet NPO Exceptions are: Sips of Water with Meds  Code:Full Code  DVT PPX Lovenox  Disposition monitor inpatient at present,      Brief Course: Patient presented with symptoms of shortness of breath and chest pain. Does seem to past history of cardiomyopathy with admission at [facility] in 2019. CC: Shortness of breath    Subjective:  .   Patient pleasant, feels comfortable, denies any new complaints, reasonable diuresis since admission    Reviewed interval ancillary notes    Current Medications  acetaminophen (TYLENOL) tablet 1,000 mg, TID  hydrALAZINE (APRESOLINE) tablet 10 mg, 3 times per day  spironolactone (ALDACTONE) tablet 25 mg, Daily  sodium chloride flush 0.9 % injection 10 mL, 2 times per day  sodium chloride flush 0.9 % injection 10 mL, PRN  0.9 % sodium chloride infusion, PRN  ondansetron (ZOFRAN) injection 4 mg, Q4H PRN  polyethylene glycol (GLYCOLAX) packet 17 g, Daily PRN  acetaminophen (TYLENOL) tablet 650 mg, Q4H PRN   Or  acetaminophen (TYLENOL) suppository 650 mg, Q4H PRN  enoxaparin (LOVENOX) injection 40 mg, Daily  furosemide (LASIX) injection 40 mg, BID  aspirin chewable tablet 81 mg, Daily  perflutren lipid microspheres (DEFINITY) injection 1.65 mg, ONCE PRN        Objective:  /74   Pulse 98   Temp 98 °F (36.7 °C) (Oral)   Resp 20   Ht 5' 7\" (1.702 m)   Wt 156 lb (70.8 kg)   SpO2 95%   BMI 24.43 kg/m²     Intake/Output Summary (Last 24 hours) at 8/27/2021 1226  Last data filed at 8/27/2021 0947  Gross per 24 hour   Intake 1310 ml   Output 2225 ml   Net -915 ml      Wt Readings from Last 3 Encounters:   08/27/21 156 lb (70.8 kg)     Improving basal crackles  General appearance:  Appears comfortable  Eyes: Sclera clear. Pupils equal.  ENT: Moist oral mucosa. Trachea midline, no adenopathy. Cardiovascular: Regular rhythm, normal S1, S2. No murmur. No edema in lower extremities  Respiratory: Not using accessory muscles. Good inspiratory effort., no wheeze or crackles. GI: Abdomen soft, no tenderness, not distended, normal bowel sounds  Musculoskeletal: No cyanosis in digits, neck supple  Neurology: CN 2-12 grossly intact. No speech or motor deficits  Psych: Normal affect. Alert and oriented in time, place and person  Skin: Warm, dry, normal turgor  Extremity exam shows brisk capillary refill. Peripheral pulses are palpable in lower extremities     Labs and Tests:  CBC:   Recent Labs     08/26/21  0418 08/27/21  0618   WBC 5.3 4.5   HGB 12.5* 15.1    281     BMP:    Recent Labs     08/26/21  0418 08/27/21  0618    140   K 4.0 4.1    99   CO2 20* 26   BUN 12 19   CREATININE 0.8* 1.1   GLUCOSE 105* 119*     Hepatic: No results for input(s): AST, ALT, ALB, BILITOT, ALKPHOS in the last 72 hours.   XR CHEST PORTABLE   Final Result   Moderate pulmonary edema and less likely pneumonia          Discussed care with family        Jessica Esparza MD   8/27/2021 12:26 PM

## 2021-08-27 NOTE — PROGRESS NOTES
Patient made NPO. Spoke with patient reminded that he was not to eat or drink anything. Patient verbalized understanding.

## 2021-08-27 NOTE — CONSULTS
Brief Pre-Op Note/Sedation Assessment      Gilford Fan  1960  [id number]      [id number]  2:18 PM    Planned Procedure: Cardiac Catheterization Procedure    Post Procedure Plan: Return to same level of care    Consent: I have discussed with the patient and/or the patient representative the indication, alternatives, and the possible risks and/or complications of the planned procedure and the anesthesia methods. The patient and/or patient representative appear to understand and agree to proceed. Chief Complaint: Chest Pain/Pressure  Dyspnea      Indications for Cath Procedure:  Cardiomyopathy  Anginal Classification within 2 weeks:  CCS III - Symptoms with everyday living activities, i.e., moderate limitation  NYHA Heart Failure Class within 2 weeks: Class III - Symptoms of HF on less-than-ordinary exertion, Newly Diagnosed? No, Heart Failure Type: Systolic  Is Cath Lab Visit Valve-related?: No  Surgical Risk: N/A  Functional Type: N/A    Anti- Anginal Meds within 2 weeks:   Yes: Aspirin    Stress or Imaging Studies Performed (within 6 months):  None     Vital Signs:  /83   Pulse 90   Temp 98.1 °F (36.7 °C) (Oral)   Resp 18   Ht 5' 7\" (1.702 m)   Wt 156 lb (70.8 kg)   SpO2 95%   BMI 24.43 kg/m²     Allergies:  No Known Allergies    Past Medical History:  No past medical history on file. Surgical History:  No past surgical history on file.       Medications:  Current Facility-Administered Medications   Medication Dose Route Frequency Provider Last Rate Last Admin    acetaminophen (TYLENOL) tablet 1,000 mg  1,000 mg Oral TID Alisa Durbin MD   1,000 mg at 08/27/21 6211    hydrALAZINE (APRESOLINE) tablet 10 mg  10 mg Oral 3 times per day Sveta Hanson MD        spironolactone (ALDACTONE) tablet 25 mg  25 mg Oral Daily Sveta Hanson MD   25 mg at 08/27/21 1235    sodium chloride flush 0.9 % injection 10 mL  10 mL IntraVENous 2 times per day Pollo Alexander MD   10 mL at 08/27/21 0843    sodium chloride flush 0.9 % injection 10 mL  10 mL IntraVENous PRN Sacha Goldstein MD        0.9 % sodium chloride infusion  25 mL IntraVENous PRN Sacha Goldstein MD        ondansetron [facility]) injection 4 mg  4 mg IntraVENous Q4H PRN Scaha Goldstein MD        polyethylene glycol [facility]) packet 17 g  17 g Oral Daily PRN Sacha Goldstein MD        acetaminophen (TYLENOL) tablet 650 mg  650 mg Oral Q4H PRN Sacha Goldstein MD        Or    acetaminophen (TYLENOL) suppository 650 mg  650 mg Rectal Q4H PRN Sacha Goldstein MD        enoxaparin (LOVENOX) injection 40 mg  40 mg Subcutaneous Daily Sacha Goldstein MD   40 mg at 08/26/21 1004    furosemide (LASIX) injection 40 mg  40 mg IntraVENous BID Sacha Goldstein MD   40 mg at 08/27/21 7002    aspirin chewable tablet 81 mg  81 mg Oral Daily Chrisa Patricia Silverman MD   81 mg at 08/27/21 0596    perflutren lipid microspheres (DEFINITY) injection 1.65 mg  1.5 mL IntraVENous ONCE PRN Iman Arenas MD               Pre-Sedation:    Pre-Sedation Documentation and Exam:  I have assessed the patient and agree with the H&P present on the chart. Prior History of Anesthesia Complications:   none    Modified Mallampati:  II (soft palate, uvula, fauces visible)    ASA Classification:  Class 3 - A patient with severe systemic disease that limits activity but is not incapacitating      All Scale: Activity:  2 - Able to move 4 extremities voluntarily on command  Respiration:  2 - Able to breathe deeply and cough freely  Circulation:  2 - BP+/- 20mmHg of normal  Consciousness:  2 - Fully awake  Oxygen Saturation (color):  2 - Able to maintain oxygen saturation >92% on room air    Sedation/Anesthesia Plan:  Guard the patient's safety and welfare. Minimize physical discomfort and pain. Minimize negative psychological responses to treatment by providing sedation and analgesia and maximize the potential amnesia.   Patient to meet pre-procedure discharge plan.     Medication Planned:  midazolam intravenously and fentanyl intravenously    Patient is an appropriate candidate for plan of sedation: yes      Electronically signed by Simon Johnson MD on 8/27/2021 at 2:18 PM

## 2021-08-27 NOTE — CONSULTS
Cardiology Consultation                                                                    Pt Name: Leilani Heaton  Age: 62 y.o. Sex: male  : 1960  Location: 6304/6304-01    Referring Physician: Keith Krishna MD  Primary cardiologist: N/A      Reason for Consult:     Reason for Consultation/Chief Complaint: chest tightness    HPI:      Leilani Heaton is a 62 y.o. male with a past medical history of nonischemic cardiomyopathy with severe left ventricular dysfunction (Echo 2021 showed EF <25%), LVH, mild MR, former smoker and HTN    Echo: (2021)  Severely decreased left ventricular systolic function with an estimated ejection fraction of <25%. Left ventricular enlargement. Mild concentric left ventricular hypertrophy. Severe global hypokinesis/akinesis. Indeterminate diastolic function. Mild MR. Trace-mild TR. Mildly calcified aortic valve. The pulmonic valve is not well visualized. Trivial pulmonic regurgitation present. Echo: (2019)  The EF is visually estimated to be 10%. Moderate LV dilatation with severe LV dysfunction ejection fraction of 15% estimated, severe global hypokinesis, diastolic function consistent with elevated LV filling pressures Left and Right Atrium with moderate enlargement. Mild mitral regurgitation. Trace tricuspid regurgitation. NM Stress test: (2019)  No definite myocardial ischemia or scar. Dilated LV cavity with global hypokinesis. Low LVEF of 14%. [id number]  NSR    No previous cath available     Patient presented to ED due to chest tightness that started suddenly. Reported 7/10, worse with lying flat and improved with sitting up. Patient had been experiencing increased SOB and fatigue recently. Patient was brought in by EMS. Of note, patient was diagnosed with heart failure in 2019 at [facility], echo at that time showed EF of 10%. He has been non-compliant with medications since and does not have good insight on disease.     In ED, EKG showed NSR. CXR in ED moderate pulmonary edema. Trops were 0.01 x2. pro-BNP was elevated at 2012. Patient was given 40 IV of lasix. UDS positive for cocaine. We were consulted by Dr. Dahiana Neil for heart failure management. Histories     Past Medical History:   has no past medical history on file. Surgical History:   has no past surgical history on file. Social History:   reports that he has quit smoking. He has never used smokeless tobacco. He reports previous alcohol use. He reports current drug use. Drug: Marijuana. Family History:  No evidence for sudden cardiac death or premature CAD      Medications:       Home Medications  Were reviewed and are listed in nursing record. and/or listed below  Prior to Admission medications    Not on File          Inpatient Medications:   acetaminophen  1,000 mg Oral TID    sodium chloride flush  10 mL IntraVENous 2 times per day    enoxaparin  40 mg Subcutaneous Daily    furosemide  40 mg IntraVENous BID    aspirin  81 mg Oral Daily       IV drips:   sodium chloride         PRN:  sodium chloride flush, sodium chloride, ondansetron, polyethylene glycol, acetaminophen **OR** acetaminophen, perflutren lipid microspheres    Allergy:     Patient has no known allergies. Review of Systems:     All 12 point review of symptoms completed. Pertinent positives identified in the HPI, all other review of symptoms negative as below. CONSTITUTIONAL: No fatigue  SKIN: No rash or pruritis. EYES: No visual changes or diplopia. No scleral icterus. ENT: No Headaches, hearing loss or vertigo. No mouth sores or sore throat. CARDIOVASCULAR: No chest pain/chest pressure/chest discomfort. No palpitations. No edema. RESPIRATORY: No dyspnea. No cough or wheezing, no sputum production. GASTROINTESTINAL: No N/V/D. No abdominal pain, appetite loss, blood in stools. GENITOURINARY: No dysuria, trouble voiding, or hematuria.   MUSCULOSKELETAL:  No gait disturbance, weakness or joint complaints. NEUROLOGICAL: No headache, diplopia, change in muscle strength, numbness or tingling. No change in gait, balance, coordination, mood, affect, memory, mentation, behavior. ENDOCRINE: No excessive thirst, fluid intake, or urination. No tremor. HEMATOLOGIC: No abnormal bruising or bleeding. ALLERGY: No nasal congestion or hives. Physical Examination:     Vitals:    08/26/21 2140 08/27/21 0149 08/27/21 0323 08/27/21 0841   BP: (!) 137/108 (!) 131/93 (!) 138/103    Pulse: 101 101 77    Resp: 20 18 18    Temp: 97 °F (36.1 °C) 98.2 °F (36.8 °C) 97.7 °F (36.5 °C) 98 °F (36.7 °C)   TempSrc: Oral Oral Oral    SpO2: 93% 93% 97% 95%   Weight:       Height:           Wt Readings from Last 3 Encounters:   08/26/21 167 lb 12.3 oz (76.1 kg)         General Appearance:  Alert, cooperative, no distress, appears stated age Appropriate weight   Head:  Normocephalic, without obvious abnormality, atraumatic   Eyes:  PERRL, conjunctiva/corneas clear EOM intact  Ears normal   Throat no lesions       Nose: Nares normal, no drainage or sinus tenderness   Throat: Lips, mucosa, and tongue normal   Neck: Supple, symmetrical, trachea midline, no adenopathy, thyroid: not enlarged, symmetric, no tenderness/mass/nodules, no carotid bruit. Lungs:   Respirations unlabored, clear to auscultation bilaterally, without any wheezes, rubs or ronchi. Chest Wall:  No tenderness or deformity   Heart:  Regular rhythm, rate is controlled, S1, S2 normal, there is no murmur, there is no rub or gallop, no jvd, no bilateral lower extremity edema   Abdomen:   Soft, non-tender, bowel sounds active all four quadrants,  no masses, no organomegaly       Extremities: Extremities normal, atraumatic, no cyanosis.     Pulses: 2+ and symmetric   Skin: Skin color, texture, turgor normal, no rashes or lesions   Pysch: Normal mood and affect   Neurologic: Normal gross motor and sensory exam.  Cranial nerves intact        Labs:     Recent Labs 08/26/21  0418 08/27/21  0618    140   K 4.0 4.1   BUN 12 19   CREATININE 0.8* 1.1    99   CO2 20* 26   GLUCOSE 105* 119*   CALCIUM 9.4 10.1   MG 1.90  --      Recent Labs     08/26/21  0418 08/26/21  0418 08/27/21  0618   WBC 5.3  --  4.5   HGB 12.5*  --  15.1   HCT 35.7*  --  43.2     --  281   MCV 87.6   < > 87.2    < > = values in this interval not displayed. No results for input(s): CHOLTOT, TRIG, HDL in the last 72 hours. Invalid input(s): LIPIDCOMM, CHOLHDL, VLDCHOL, LDL  No results for input(s): PTT, INR in the last 72 hours. Invalid input(s): PT  Recent Labs     08/26/21 0418 08/26/21  0610   TROPONINI 0.01 0.01     No results for input(s): BNP in the last 72 hours. No results for input(s): TSH in the last 72 hours. No results for input(s): CHOL, HDL, LDLCALC, TRIG in the last 72 hours.]    Lab Results   Component Value Date    TROPONINI 0.01 08/26/2021         Imaging:     Telemetry personally reviewed:  NSR (sinus tach with PAC/PVCs and VTach this morning)    I have personally reviewed patient's ECG which shows NSR    Assessment / Plan:     Mr. Venice Garner is a 62 yoM with a PMHx of nonischemic cardiomyopathy with severe left ventricular dysfunction (Echo 8/2021 showed EF <25%), mild MR, former smoker and HTN    Acute of chronic systolic CHF  Recent echo shows EF of ~ 10%. Moderate LV dilatation with severe LV dysfunction ejection fraction of 15% estimated, severe global hypokinesis, diastolic function consistent with elevated LV filling pressures. Patient non-compliant with home medications.  Recent CXR on this admission shows moderate pulmonary edema.  - Plan for cardiac cath this afternoon  - NPO except for sips with meds  - Continue diuresis with lasix 40 mg IV BID  - Continue ASA 81 mg  - Start hydralyzine 10 mg tid for afterload reduction.   - Start spironolactone 25 mg daily  - Continue low sodium diet   - Strict I/Os  - Daily weights  - Continuous tele    HTN  Does not take any BP medications at home  - Started on hydralyzine and spironolactone    Illicit Drug Use   - UDS positive for cocaine and cannabinoid  - Patient counseled on the risks and cardiac morbidity associated with using recreational drugs, specifically cocaine, encouraged to quit      Carmen Castaneda MD, PGY-1    I have personally seen and staffed this patient with Dr. Lroi Banuelos MD.     Kaleigh Mccollum note by resident was edited based on my personal history and exam of the patient. I have personally reviewed the reports and images of labs, radiological studies, cardiac studies including ECG's and telemetry, current and old medical records. The note was completed using EMR. Every effort was made to ensure accuracy; however, inadvertent computerized transcription errors may be present. All questions and concerns were addressed to the patient/family. Alternatives to my treatment were discussed.      Shaista Gonzalez MD, [facility] [place], [address]  [address] [id number]  Office Phone Number: [phone]

## 2021-08-28 LAB
ANION GAP SERPL CALCULATED.3IONS-SCNC: 14 MMOL/L (ref 3–16)
BASOPHILS ABSOLUTE: 0 K/UL (ref 0–0.2)
BASOPHILS RELATIVE PERCENT: 0.8 %
BUN BLDV-MCNC: 21 MG/DL (ref 7–20)
CALCIUM SERPL-MCNC: 9.6 MG/DL (ref 8.3–10.6)
CHLORIDE BLD-SCNC: 98 MMOL/L (ref 99–110)
CO2: 26 MMOL/L (ref 21–32)
CREAT SERPL-MCNC: 1 MG/DL (ref 0.9–1.3)
EOSINOPHILS ABSOLUTE: 0.2 K/UL (ref 0–0.6)
EOSINOPHILS RELATIVE PERCENT: 4.3 %
GFR AFRICAN AMERICAN: >60
GFR NON-AFRICAN AMERICAN: >60
GLUCOSE BLD-MCNC: 108 MG/DL (ref 70–99)
GLUCOSE BLD-MCNC: 110 MG/DL (ref 70–99)
HCT VFR BLD CALC: 42.2 % (ref 40.5–52.5)
HCT VFR BLD CALC: 43.9 % (ref 40.5–52.5)
HEMOGLOBIN: 14.8 G/DL (ref 13.5–17.5)
HEMOGLOBIN: 15.1 G/DL (ref 13.5–17.5)
LYMPHOCYTES ABSOLUTE: 1.6 K/UL (ref 1–5.1)
LYMPHOCYTES RELATIVE PERCENT: 30.7 %
MAGNESIUM: 2.2 MG/DL (ref 1.8–2.4)
MCH RBC QN AUTO: 30.4 PG (ref 26–34)
MCHC RBC AUTO-ENTMCNC: 34.4 G/DL (ref 31–36)
MCV RBC AUTO: 88.4 FL (ref 80–100)
MONOCYTES ABSOLUTE: 0.5 K/UL (ref 0–1.3)
MONOCYTES RELATIVE PERCENT: 10.4 %
NEUTROPHILS ABSOLUTE: 2.8 K/UL (ref 1.7–7.7)
NEUTROPHILS RELATIVE PERCENT: 53.8 %
PDW BLD-RTO: 13.1 % (ref 12.4–15.4)
PERFORMED ON: ABNORMAL
PLATELET # BLD: 267 K/UL (ref 135–450)
PMV BLD AUTO: 8.8 FL (ref 5–10.5)
POTASSIUM REFLEX MAGNESIUM: 3.5 MMOL/L (ref 3.5–5.1)
RBC # BLD: 4.97 M/UL (ref 4.2–5.9)
SODIUM BLD-SCNC: 138 MMOL/L (ref 136–145)
WBC # BLD: 5.2 K/UL (ref 4–11)

## 2021-08-28 PROCEDURE — 85025 COMPLETE CBC W/AUTO DIFF WBC: CPT

## 2021-08-28 PROCEDURE — 1200000000 HC SEMI PRIVATE

## 2021-08-28 PROCEDURE — 6370000000 HC RX 637 (ALT 250 FOR IP): Performed by: INTERNAL MEDICINE

## 2021-08-28 PROCEDURE — 85014 HEMATOCRIT: CPT

## 2021-08-28 PROCEDURE — 36415 COLL VENOUS BLD VENIPUNCTURE: CPT

## 2021-08-28 PROCEDURE — 2580000003 HC RX 258: Performed by: INTERNAL MEDICINE

## 2021-08-28 PROCEDURE — 85018 HEMOGLOBIN: CPT

## 2021-08-28 PROCEDURE — 6360000002 HC RX W HCPCS: Performed by: INTERNAL MEDICINE

## 2021-08-28 PROCEDURE — 99233 SBSQ HOSP IP/OBS HIGH 50: CPT | Performed by: INTERNAL MEDICINE

## 2021-08-28 PROCEDURE — 83735 ASSAY OF MAGNESIUM: CPT

## 2021-08-28 PROCEDURE — 80048 BASIC METABOLIC PNL TOTAL CA: CPT

## 2021-08-28 PROCEDURE — 6360000002 HC RX W HCPCS

## 2021-08-28 PROCEDURE — 6360000002 HC RX W HCPCS: Performed by: STUDENT IN AN ORGANIZED HEALTH CARE EDUCATION/TRAINING PROGRAM

## 2021-08-28 RX ORDER — MORPHINE SULFATE 2 MG/ML
2 INJECTION, SOLUTION INTRAMUSCULAR; INTRAVENOUS EVERY 4 HOURS PRN
Status: DISCONTINUED | OUTPATIENT
Start: 2021-08-28 | End: 2021-08-29

## 2021-08-28 RX ORDER — METHOCARBAMOL 500 MG/1
500 TABLET, FILM COATED ORAL 3 TIMES DAILY PRN
Status: DISCONTINUED | OUTPATIENT
Start: 2021-08-28 | End: 2021-08-30 | Stop reason: HOSPADM

## 2021-08-28 RX ADMIN — ACETAMINOPHEN 1000 MG: 500 TABLET ORAL at 21:45

## 2021-08-28 RX ADMIN — SPIRONOLACTONE 25 MG: 25 TABLET, FILM COATED ORAL at 09:34

## 2021-08-28 RX ADMIN — HYDRALAZINE HYDROCHLORIDE 10 MG: 10 TABLET, FILM COATED ORAL at 05:24

## 2021-08-28 RX ADMIN — HYDROMORPHONE HYDROCHLORIDE 1 MG: 1 INJECTION, SOLUTION INTRAMUSCULAR; INTRAVENOUS; SUBCUTANEOUS at 17:57

## 2021-08-28 RX ADMIN — Medication 10 ML: at 21:45

## 2021-08-28 RX ADMIN — FUROSEMIDE 40 MG: 10 INJECTION, SOLUTION INTRAMUSCULAR; INTRAVENOUS at 09:34

## 2021-08-28 RX ADMIN — ACETAMINOPHEN 1000 MG: 500 TABLET ORAL at 16:00

## 2021-08-28 RX ADMIN — HYDRALAZINE HYDROCHLORIDE 10 MG: 10 TABLET, FILM COATED ORAL at 21:45

## 2021-08-28 RX ADMIN — HYDRALAZINE HYDROCHLORIDE 10 MG: 10 TABLET, FILM COATED ORAL at 16:00

## 2021-08-28 RX ADMIN — ACETAMINOPHEN 1000 MG: 500 TABLET ORAL at 09:34

## 2021-08-28 RX ADMIN — ASPIRIN 81 MG: 81 TABLET, CHEWABLE ORAL at 09:34

## 2021-08-28 RX ADMIN — Medication 10 ML: at 09:35

## 2021-08-28 RX ADMIN — ENOXAPARIN SODIUM 40 MG: 40 INJECTION SUBCUTANEOUS at 09:34

## 2021-08-28 RX ADMIN — FUROSEMIDE 40 MG: 10 INJECTION, SOLUTION INTRAMUSCULAR; INTRAVENOUS at 17:00

## 2021-08-28 ASSESSMENT — PAIN - FUNCTIONAL ASSESSMENT: PAIN_FUNCTIONAL_ASSESSMENT: PREVENTS OR INTERFERES SOME ACTIVE ACTIVITIES AND ADLS

## 2021-08-28 ASSESSMENT — PAIN DESCRIPTION - PAIN TYPE: TYPE: ACUTE PAIN

## 2021-08-28 ASSESSMENT — PAIN SCALES - GENERAL
PAINLEVEL_OUTOF10: 0
PAINLEVEL_OUTOF10: 3
PAINLEVEL_OUTOF10: 6
PAINLEVEL_OUTOF10: 10
PAINLEVEL_OUTOF10: 3

## 2021-08-28 ASSESSMENT — PAIN DESCRIPTION - ONSET: ONSET: ON-GOING

## 2021-08-28 ASSESSMENT — PAIN DESCRIPTION - ORIENTATION: ORIENTATION: RIGHT

## 2021-08-28 ASSESSMENT — PAIN DESCRIPTION - FREQUENCY: FREQUENCY: CONTINUOUS

## 2021-08-28 ASSESSMENT — PAIN DESCRIPTION - LOCATION: LOCATION: GROIN

## 2021-08-28 ASSESSMENT — PAIN DESCRIPTION - PROGRESSION: CLINICAL_PROGRESSION: NOT CHANGED

## 2021-08-28 ASSESSMENT — PAIN DESCRIPTION - DESCRIPTORS: DESCRIPTORS: ACHING;SORE

## 2021-08-28 NOTE — PROGRESS NOTES
Pt had been calm, A&O X4, R groin site c/d/i no s/s hematoma for entirety of shift, then at 5:50pm pt began screaming \"My leg! It hurts! \" Large hematoma noted forming at R groin site. Attempting to hold pressure to site, but pt screaming, thrashing arms and legs, yelling \"Get off me! Give me something for pain! \" Rapid response called. Dr Jori Lynne paged. Medical residents at bedside to assess pt. 2mg IV dilaudid administered to pt. Pressure held to R groin site for 15 minutes. No s/s bleeding noted to abd, back, or R leg. Will cont to monitor per order. Bed alarm on for safety and bed position locked. Pt instructed not to move/bend R leg. Saline bag applied to R groin for continuous pressure. Call light in reach. Will cont to monitor.

## 2021-08-28 NOTE — SIGNIFICANT EVENT
Rapid response called at 1742 for right groin hematoma. Patient admitted for chest pain. Had [address] today. Right femoral artery angioseal for arteriotomy closure. Nursing noted right groin hematoma, approximately size of tennis ball. Pressure being applied. Patient yellingin pain. H/H earlier today of 15. Not on AC or AP agents. VSS. F/u repeat H/H. Will reassess hematoma in 1 hour. If there is progression will order CTA w/ and w/o. Dr. Cruz Alfonso aware.     Willie Florentino MD  Perfect Serve

## 2021-08-28 NOTE — PROGRESS NOTES
CREATININE 0.8* 1.1 1.0    99 98*   CO2 20* 26 26   GLUCOSE 105* 119* 110*   CALCIUM 9.4 10.1 9.6   MG 1.90  --  2.20     Recent Labs     08/26/21 0418 08/26/21 0418 08/27/21  0618 08/27/21  0618 08/28/21  0743   WBC 5.3  --  4.5  --  5.2   HGB 12.5*  --  15.1  --  15.1   HCT 35.7*  --  43.2  --  43.9     --  281  --  267   MCV 87.6   < > 87.2   < > 88.4    < > = values in this interval not displayed. No results for input(s): CHOLTOT, TRIG, HDL in the last 72 hours. Invalid input(s): LIPIDCOMM, CHOLHDL, VLDCHOL, LDL  No results for input(s): PTT, INR in the last 72 hours. Invalid input(s): PT  Recent Labs     08/26/21 0418 08/26/21  0610   TROPONINI 0.01 0.01     No results for input(s): BNP in the last 72 hours. No results for input(s): NTPROBNP in the last 72 hours. No results for input(s): TSH in the last 72 hours. Imaging:   I personally reviewed imaging studies including CXR, Stress test, TTE/MANSI. Assessment & Plan:     Acute on chronic systolic heart failure/nonobstructive coronary disease.  -So far -2.1 L.  -LVEDP on cath 14, near euvolemia.  -Cath yesterday with nonobstructive coronary disease  -Creatinine slowly trending up  -Transition to oral diuretics torsemide 40 mg p.o. twice a day  -Continue current dose of hydralazine for afterload reduction.  -Not on beta-blockers given substance abuse  -Okay to DC home either today or tomorrow. Thank you for allowing to us to participate in the care of [address]. Please call our service with questions. All questions and concerns were addressed to the patient/family. Alternatives to my treatment were discussed. The note was completed using EMR. Every effort was made to ensure accuracy; however, inadvertent computerized transcription errors may be present. I have personally reviewed the reports and images of labs, radiological studies, cardiac studies including ECG's and telemetry, current and old medical records. Karthik Moctezuma MD, [facility]    8/28/2021 10:56 AM

## 2021-08-28 NOTE — PROGRESS NOTES
HOSPITALISTS PROGRESS NOTE    8/28/2021 12:51 PM        Name: Sonu Renya . Admitted: 8/26/2021  Primary Care Provider: No primary care provider on file. (Tel: None)      Problem List  Active Problems:    CHF with unknown LVEF (HCC)    Dilated cardiomyopathy ([address]    Acute pulmonary edema (HCC)  Resolved Problems:    * No resolved hospital problems. *       Assessment & Plan:   Acute on chronic systolic heart failure   IV Lasix to continue, repeat echo, cardiology consultation, monitor on telemetry, troponins are negative, EF 25%, continue on diuretics, s/p angiogram on 27 August patent coronaries, still having orthopnea symptoms diuresis IV for another day,       Chest pain  Monitor on telemetry, serial troponin negative, await echo results and depending on that might consider ischemic work-up, cath plan for afternoon    Drug screen is positive for cocaine and cannabis       IV Access: Peripheral  Rinaldi: No  Diet: ADULT DIET; Regular; Low Fat/Low Chol/High Fiber/2 gm Na  Code:Full Code  DVT PPX Lovenox  Disposition monitor inpatient at present, hopefully plan to discharge in the morning      Brief Course: Patient presented with symptoms of shortness of breath and chest pain. Does seem to past history of cardiomyopathy with admission at [facility] in 2019. CC: Shortness of breath    Subjective:  .   S/p cath yesterday, patient feels better, does mention that still gets short of breath when he is lying flat,    Reviewed interval ancillary notes    Current Medications  acetaminophen (TYLENOL) tablet 1,000 mg, TID  hydrALAZINE (APRESOLINE) tablet 10 mg, 3 times per day  spironolactone (ALDACTONE) tablet 25 mg, Daily  acetaminophen (TYLENOL) tablet 650 mg, Q4H PRN  sodium chloride flush 0.9 % injection 10 mL, 2 times per day  sodium chloride flush 0.9 % injection 10 mL, PRN  0.9 % sodium chloride infusion, PRN  ondansetron [facility]) injection 4 mg, Q4H PRN  polyethylene glycol (GLYCOLAX) packet 17 g, Daily PRN  acetaminophen (TYLENOL) suppository 650 mg, Q4H PRN  enoxaparin (LOVENOX) injection 40 mg, Daily  furosemide (LASIX) injection 40 mg, BID  aspirin chewable tablet 81 mg, Daily  perflutren lipid microspheres (DEFINITY) injection 1.65 mg, ONCE PRN        Objective:  BP (!) 145/97   Pulse 87   Temp 97 °F (36.1 °C) (Oral)   Resp 16   Ht 5' 7\" (1.702 m)   Wt 156 lb (70.8 kg)   SpO2 99%   BMI 24.43 kg/m²     Intake/Output Summary (Last 24 hours) at 8/28/2021 1251  Last data filed at 8/28/2021 1102  Gross per 24 hour   Intake 480 ml   Output 625 ml   Net -145 ml      Wt Readings from Last 3 Encounters:   08/27/21 156 lb (70.8 kg)     Improving basal crackles  General appearance:  Appears comfortable  Eyes: Sclera clear. Pupils equal.  ENT: Moist oral mucosa. Trachea midline, no adenopathy. Cardiovascular: Regular rhythm, normal S1, S2. No murmur. No edema in lower extremities  Respiratory: Not using accessory muscles. Good inspiratory effort., no wheeze or crackles. GI: Abdomen soft, no tenderness, not distended, normal bowel sounds  Musculoskeletal: No cyanosis in digits, neck supple  Neurology: CN 2-12 grossly intact. No speech or motor deficits  Psych: Normal affect. Alert and oriented in time, place and person  Skin: Warm, dry, normal turgor  Extremity exam shows brisk capillary refill. Peripheral pulses are palpable in lower extremities     Labs and Tests:  CBC:   Recent Labs     08/26/21  0418 08/27/21  0618 08/28/21  0743   WBC 5.3 4.5 5.2   HGB 12.5* 15.1 15.1    281 267     BMP:    Recent Labs     08/26/21  0418 08/27/21  0618 08/28/21  0743    140 138   K 4.0 4.1 3.5    99 98*   CO2 20* 26 26   BUN 12 19 21*   CREATININE 0.8* 1.1 1.0   GLUCOSE 105* 119* 110*     Hepatic: No results for input(s): AST, ALT, ALB, BILITOT, ALKPHOS in the last 72 hours.   XR CHEST PORTABLE   Final Result   Moderate pulmonary edema and less likely pneumonia      Cardiac cath impression  IMPRESSION:  1. Severe LV dysfunction with estimated ejection fraction of 10%  consistent with cardiomyopathy. 2.  No significant obstructive coronary artery disease.   3.  Successful Angio-Seal of right femoral arteriotomy site    Discussed care with family        Matty Crawford MD   8/28/2021 12:51 PM

## 2021-08-29 ENCOUNTER — APPOINTMENT (OUTPATIENT)
Dept: CT IMAGING | Age: 61
DRG: 192 | End: 2021-08-29
Payer: COMMERCIAL

## 2021-08-29 LAB
ANION GAP SERPL CALCULATED.3IONS-SCNC: 14 MMOL/L (ref 3–16)
BASOPHILS ABSOLUTE: 0 K/UL (ref 0–0.2)
BASOPHILS RELATIVE PERCENT: 0.5 %
BUN BLDV-MCNC: 22 MG/DL (ref 7–20)
CALCIUM SERPL-MCNC: 9.5 MG/DL (ref 8.3–10.6)
CHLORIDE BLD-SCNC: 97 MMOL/L (ref 99–110)
CO2: 27 MMOL/L (ref 21–32)
CREAT SERPL-MCNC: 0.9 MG/DL (ref 0.9–1.3)
EOSINOPHILS ABSOLUTE: 0.2 K/UL (ref 0–0.6)
EOSINOPHILS RELATIVE PERCENT: 3.3 %
GFR AFRICAN AMERICAN: >60
GFR NON-AFRICAN AMERICAN: >60
GLUCOSE BLD-MCNC: 131 MG/DL (ref 70–99)
HCT VFR BLD CALC: 42 % (ref 40.5–52.5)
HEMOGLOBIN: 14.5 G/DL (ref 13.5–17.5)
LYMPHOCYTES ABSOLUTE: 2 K/UL (ref 1–5.1)
LYMPHOCYTES RELATIVE PERCENT: 36.8 %
MCH RBC QN AUTO: 30.3 PG (ref 26–34)
MCHC RBC AUTO-ENTMCNC: 34.6 G/DL (ref 31–36)
MCV RBC AUTO: 87.5 FL (ref 80–100)
MONOCYTES ABSOLUTE: 0.5 K/UL (ref 0–1.3)
MONOCYTES RELATIVE PERCENT: 8.2 %
NEUTROPHILS ABSOLUTE: 2.8 K/UL (ref 1.7–7.7)
NEUTROPHILS RELATIVE PERCENT: 51.2 %
PDW BLD-RTO: 13.1 % (ref 12.4–15.4)
PLATELET # BLD: 273 K/UL (ref 135–450)
PMV BLD AUTO: 8.6 FL (ref 5–10.5)
POTASSIUM REFLEX MAGNESIUM: 3.8 MMOL/L (ref 3.5–5.1)
RBC # BLD: 4.8 M/UL (ref 4.2–5.9)
SODIUM BLD-SCNC: 138 MMOL/L (ref 136–145)
WBC # BLD: 5.5 K/UL (ref 4–11)

## 2021-08-29 PROCEDURE — 80048 BASIC METABOLIC PNL TOTAL CA: CPT

## 2021-08-29 PROCEDURE — 6370000000 HC RX 637 (ALT 250 FOR IP): Performed by: INTERNAL MEDICINE

## 2021-08-29 PROCEDURE — 74178 CT ABD&PLV WO CNTR FLWD CNTR: CPT

## 2021-08-29 PROCEDURE — 6360000002 HC RX W HCPCS: Performed by: INTERNAL MEDICINE

## 2021-08-29 PROCEDURE — 2580000003 HC RX 258: Performed by: INTERNAL MEDICINE

## 2021-08-29 PROCEDURE — 99233 SBSQ HOSP IP/OBS HIGH 50: CPT | Performed by: INTERNAL MEDICINE

## 2021-08-29 PROCEDURE — 36415 COLL VENOUS BLD VENIPUNCTURE: CPT

## 2021-08-29 PROCEDURE — 6360000004 HC RX CONTRAST MEDICATION: Performed by: INTERNAL MEDICINE

## 2021-08-29 PROCEDURE — 6370000000 HC RX 637 (ALT 250 FOR IP): Performed by: SURGERY

## 2021-08-29 PROCEDURE — 85025 COMPLETE CBC W/AUTO DIFF WBC: CPT

## 2021-08-29 PROCEDURE — 6360000002 HC RX W HCPCS: Performed by: STUDENT IN AN ORGANIZED HEALTH CARE EDUCATION/TRAINING PROGRAM

## 2021-08-29 PROCEDURE — 1200000000 HC SEMI PRIVATE

## 2021-08-29 RX ORDER — MORPHINE SULFATE 2 MG/ML
2 INJECTION, SOLUTION INTRAMUSCULAR; INTRAVENOUS EVERY 6 HOURS PRN
Status: DISCONTINUED | OUTPATIENT
Start: 2021-08-29 | End: 2021-08-30 | Stop reason: HOSPADM

## 2021-08-29 RX ADMIN — FUROSEMIDE 40 MG: 10 INJECTION, SOLUTION INTRAMUSCULAR; INTRAVENOUS at 18:15

## 2021-08-29 RX ADMIN — Medication 10 ML: at 08:01

## 2021-08-29 RX ADMIN — MORPHINE SULFATE 2 MG: 2 INJECTION, SOLUTION INTRAMUSCULAR; INTRAVENOUS at 08:00

## 2021-08-29 RX ADMIN — MORPHINE SULFATE 2 MG: 2 INJECTION, SOLUTION INTRAMUSCULAR; INTRAVENOUS at 18:13

## 2021-08-29 RX ADMIN — MORPHINE SULFATE 2 MG: 2 INJECTION, SOLUTION INTRAMUSCULAR; INTRAVENOUS at 04:31

## 2021-08-29 RX ADMIN — HYDRALAZINE HYDROCHLORIDE 10 MG: 10 TABLET, FILM COATED ORAL at 04:30

## 2021-08-29 RX ADMIN — ASPIRIN 81 MG: 81 TABLET, CHEWABLE ORAL at 08:00

## 2021-08-29 RX ADMIN — ACETAMINOPHEN 1000 MG: 500 TABLET ORAL at 08:00

## 2021-08-29 RX ADMIN — HYDRALAZINE HYDROCHLORIDE 10 MG: 10 TABLET, FILM COATED ORAL at 13:42

## 2021-08-29 RX ADMIN — ACETAMINOPHEN 1000 MG: 500 TABLET ORAL at 20:45

## 2021-08-29 RX ADMIN — METHOCARBAMOL 500 MG: 500 TABLET ORAL at 08:00

## 2021-08-29 RX ADMIN — ACETAMINOPHEN 1000 MG: 500 TABLET ORAL at 13:42

## 2021-08-29 RX ADMIN — HYDRALAZINE HYDROCHLORIDE 10 MG: 10 TABLET, FILM COATED ORAL at 20:46

## 2021-08-29 RX ADMIN — FUROSEMIDE 40 MG: 10 INJECTION, SOLUTION INTRAMUSCULAR; INTRAVENOUS at 08:00

## 2021-08-29 RX ADMIN — METHOCARBAMOL 500 MG: 500 TABLET ORAL at 13:42

## 2021-08-29 RX ADMIN — MORPHINE SULFATE 2 MG: 2 INJECTION, SOLUTION INTRAMUSCULAR; INTRAVENOUS at 13:42

## 2021-08-29 RX ADMIN — ENOXAPARIN SODIUM 40 MG: 40 INJECTION SUBCUTANEOUS at 08:00

## 2021-08-29 RX ADMIN — IOPAMIDOL 80 ML: 755 INJECTION, SOLUTION INTRAVENOUS at 11:51

## 2021-08-29 RX ADMIN — SPIRONOLACTONE 25 MG: 25 TABLET, FILM COATED ORAL at 08:00

## 2021-08-29 ASSESSMENT — PAIN DESCRIPTION - ORIENTATION
ORIENTATION: RIGHT

## 2021-08-29 ASSESSMENT — PAIN DESCRIPTION - ONSET
ONSET: ON-GOING

## 2021-08-29 ASSESSMENT — PAIN SCALES - GENERAL
PAINLEVEL_OUTOF10: 3
PAINLEVEL_OUTOF10: 7
PAINLEVEL_OUTOF10: 7
PAINLEVEL_OUTOF10: 4
PAINLEVEL_OUTOF10: 8
PAINLEVEL_OUTOF10: 0
PAINLEVEL_OUTOF10: 8
PAINLEVEL_OUTOF10: 3
PAINLEVEL_OUTOF10: 6
PAINLEVEL_OUTOF10: 6
PAINLEVEL_OUTOF10: 7

## 2021-08-29 ASSESSMENT — PAIN DESCRIPTION - FREQUENCY
FREQUENCY: CONTINUOUS

## 2021-08-29 ASSESSMENT — PAIN DESCRIPTION - DESCRIPTORS
DESCRIPTORS: ACHING
DESCRIPTORS: SORE
DESCRIPTORS: ACHING
DESCRIPTORS: SORE
DESCRIPTORS: BURNING;ACHING
DESCRIPTORS: ACHING
DESCRIPTORS: ACHING;SORE

## 2021-08-29 ASSESSMENT — PAIN DESCRIPTION - PROGRESSION
CLINICAL_PROGRESSION: NOT CHANGED

## 2021-08-29 ASSESSMENT — PAIN DESCRIPTION - DIRECTION
RADIATING_TOWARDS: BACK
RADIATING_TOWARDS: BACK AND ABD
RADIATING_TOWARDS: BACK, ABD

## 2021-08-29 ASSESSMENT — PAIN - FUNCTIONAL ASSESSMENT
PAIN_FUNCTIONAL_ASSESSMENT: PREVENTS OR INTERFERES SOME ACTIVE ACTIVITIES AND ADLS
PAIN_FUNCTIONAL_ASSESSMENT: ACTIVITIES ARE NOT PREVENTED
PAIN_FUNCTIONAL_ASSESSMENT: PREVENTS OR INTERFERES SOME ACTIVE ACTIVITIES AND ADLS

## 2021-08-29 ASSESSMENT — PAIN DESCRIPTION - LOCATION
LOCATION: GROIN

## 2021-08-29 ASSESSMENT — PAIN DESCRIPTION - PAIN TYPE
TYPE: ACUTE PAIN

## 2021-08-29 NOTE — PROGRESS NOTES
[facility]  Cardiology Inpatient Consult Service  Daily Progress Note        Admit Date:  8/26/2021    Referring Physician: Aziza Dodge MD    Reason for Consultation/Chief Complaint:   Acute systolic heart failure/right groin hematoma    Subjective: Interval history:  Right groin hematoma    Medications:   acetaminophen  1,000 mg Oral TID    hydrALAZINE  10 mg Oral 3 times per day    spironolactone  25 mg Oral Daily    sodium chloride flush  10 mL IntraVENous 2 times per day    enoxaparin  40 mg Subcutaneous Daily    furosemide  40 mg IntraVENous BID    aspirin  81 mg Oral Daily       IV drips:   sodium chloride         PRN:  morphine, methocarbamol, acetaminophen, sodium chloride flush, sodium chloride, ondansetron, polyethylene glycol, [DISCONTINUED] acetaminophen **OR** acetaminophen, perflutren lipid microspheres      Objective:     Vitals:    08/29/21 0010 08/29/21 0428 08/29/21 0610 08/29/21 0745   BP: (!) 128/92 119/87  123/79   Pulse: 95 84  92   Resp: 18 18  18   Temp: 97.4 °F (36.3 °C) 97.5 °F (36.4 °C)  98.1 °F (36.7 °C)   TempSrc: Oral Oral  Oral   SpO2: 95% 99%  96%   Weight:   156 lb 1.4 oz (70.8 kg)    Height:           Intake/Output Summary (Last 24 hours) at 8/29/2021 0956  Last data filed at 8/29/2021 0942  Gross per 24 hour   Intake 890 ml   Output 1375 ml   Net -485 ml     I/O last 3 completed shifts:   In: 0 [P.O.:640; I.V.:10]  Out: 1625 [Urine:1625]  Wt Readings from Last 3 Encounters:   08/29/21 156 lb 1.4 oz (70.8 kg)       Admit Wt: Weight: 165 lb (74.8 kg)   Todays Wt: Weight: 156 lb 1.4 oz (70.8 kg)    TELEMETRY: Personally interpreted Sinus     Physical Exam:     General:  Awake, alert, NAD  Skin:  Warm and dry  Neck:  -JVP  Chest:  Clear to auscultation, respiration normal  Cardiovascular:  RRR S1S2  Abdomen:  Soft -  Extremities:  - edema      Objective    Labs:   Recent Labs     08/27/21  0618 08/28/21  0743 08/29/21  0712    138 138   K 4.1 3.5 3.8   BUN 19 21* 22*   CREATININE 1.1 1.0 0.9   CL 99 98* 97*   CO2 26 26 27   GLUCOSE 119* 110* 131*   CALCIUM 10.1 9.6 9.5   MG  --  2.20  --      Recent Labs     08/27/21  0618 08/27/21  0618 08/28/21  0743 08/28/21  0743 08/28/21  1824 08/29/21  0712   WBC 4.5  --  5.2  --   --  5.5   HGB 15.1   < > 15.1  --  14.8 14.5   HCT 43.2   < > 43.9  --  42.2 42.0     --  267  --   --  273   MCV 87.2   < > 88.4   < >  --  87.5    < > = values in this interval not displayed. No results for input(s): CHOLTOT, TRIG, HDL in the last 72 hours. Invalid input(s): LIPIDCOMM, CHOLHDL, VLDCHOL, LDL  No results for input(s): PTT, INR in the last 72 hours. Invalid input(s): PT  No results for input(s): CKTOTAL, CKMB, CKMBINDEX, TROPONINI in the last 72 hours. No results for input(s): BNP in the last 72 hours. No results for input(s): NTPROBNP in the last 72 hours. No results for input(s): TSH in the last 72 hours. Imaging:   I personally reviewed imaging studies including CXR, Stress test, TTE/MANSI. Assessment & Plan:     Acute systolic heart failure/right groin hematoma  -Compensated from heart failure point of view. -Transition to Lasix 40 mg p.o. twice a day. -Continue hydralazine for afterload reduction. -Given right groin hematoma some abdominal wall pain on right flank right back pain will get a CT abdomen and pelvis prior to discharge    Thank you for allowing to us to participate in the care of Phuc 38. Please call our service with questions. All questions and concerns were addressed to the patient/family. Alternatives to my treatment were discussed. The note was completed using EMR. Every effort was made to ensure accuracy; however, inadvertent computerized transcription errors may be present. I have personally reviewed the reports and images of labs, radiological studies, cardiac studies including ECG's and telemetry, current and old medical records.

## 2021-08-29 NOTE — PROGRESS NOTES
TID  hydrALAZINE (APRESOLINE) tablet 10 mg, 3 times per day  spironolactone (ALDACTONE) tablet 25 mg, Daily  acetaminophen (TYLENOL) tablet 650 mg, Q4H PRN  sodium chloride flush 0.9 % injection 10 mL, 2 times per day  sodium chloride flush 0.9 % injection 10 mL, PRN  0.9 % sodium chloride infusion, PRN  ondansetron (ZOFRAN) injection 4 mg, Q4H PRN  polyethylene glycol (GLYCOLAX) packet 17 g, Daily PRN  acetaminophen (TYLENOL) suppository 650 mg, Q4H PRN  enoxaparin (LOVENOX) injection 40 mg, Daily  furosemide (LASIX) injection 40 mg, BID  aspirin chewable tablet 81 mg, Daily  perflutren lipid microspheres (DEFINITY) injection 1.65 mg, ONCE PRN        Objective:  BP (!) 131/96   Pulse 91   Temp 97.6 °F (36.4 °C) (Oral)   Resp 16   Ht 5' 7\" (1.702 m)   Wt 156 lb 1.4 oz (70.8 kg)   SpO2 98%   BMI 24.45 kg/m²     Intake/Output Summary (Last 24 hours) at 8/29/2021 1425  Last data filed at 8/29/2021 1346  Gross per 24 hour   Intake 1010 ml   Output 1600 ml   Net -590 ml      Wt Readings from Last 3 Encounters:   08/29/21 156 lb 1.4 oz (70.8 kg)     Improving basal crackles  General appearance:  Appears comfortable  Eyes: Sclera clear. Pupils equal.  ENT: Moist oral mucosa. Trachea midline, no adenopathy. Cardiovascular: Regular rhythm, normal S1, S2. No murmur. No edema in lower extremities  Respiratory: Not using accessory muscles. Good inspiratory effort., no wheeze or crackles. GI: Abdomen soft, no tenderness, not distended, normal bowel sounds  Musculoskeletal: No cyanosis in digits, neck supple  Neurology: CN 2-12 grossly intact. No speech or motor deficits  Psych: Normal affect. Alert and oriented in time, place and person  Skin: Warm, dry, normal turgor  Extremity exam shows brisk capillary refill.   Peripheral pulses are palpable in lower extremities     Labs and Tests:  CBC:   Recent Labs     08/27/21  0618 08/27/21  0618 08/28/21  0743 08/28/21  1824 08/29/21  0712   WBC 4.5  --  5.2  --  5.5   HGB 15.1   < > 15.1 14.8 14.5     --  267  --  273    < > = values in this interval not displayed. BMP:    Recent Labs     08/27/21  0618 08/28/21  0743 08/29/21  0712    138 138   K 4.1 3.5 3.8   CL 99 98* 97*   CO2 26 26 27   BUN 19 21* 22*   CREATININE 1.1 1.0 0.9   GLUCOSE 119* 110* 131*     Hepatic: No results for input(s): AST, ALT, ALB, BILITOT, ALKPHOS in the last 72 hours. CT ABDOMEN PELVIS W WO CONTRAST Additional Contrast? None   Final Result      There is mild ill-defined hemorrhage in the right groin without focal hematoma or evidence of vascular injury. Nonspecific mild retroperitoneal lymphadenopathy. Borderline prostatomegaly. XR CHEST PORTABLE   Final Result   Moderate pulmonary edema and less likely pneumonia      Cardiac cath impression  IMPRESSION:  1. Severe LV dysfunction with estimated ejection fraction of 10%  consistent with cardiomyopathy. 2.  No significant obstructive coronary artery disease.   3.  Successful Angio-Seal of right femoral arteriotomy site    Discussed care with family        April Covarrubias MD   8/29/2021 2:25 PM

## 2021-08-30 VITALS
RESPIRATION RATE: 16 BRPM | WEIGHT: 155.87 LBS | SYSTOLIC BLOOD PRESSURE: 122 MMHG | HEART RATE: 77 BPM | HEIGHT: 67 IN | TEMPERATURE: 98.1 F | OXYGEN SATURATION: 95 % | DIASTOLIC BLOOD PRESSURE: 84 MMHG | BODY MASS INDEX: 24.46 KG/M2

## 2021-08-30 LAB
ANION GAP SERPL CALCULATED.3IONS-SCNC: 11 MMOL/L (ref 3–16)
BASOPHILS ABSOLUTE: 0 K/UL (ref 0–0.2)
BASOPHILS RELATIVE PERCENT: 0.7 %
BUN BLDV-MCNC: 19 MG/DL (ref 7–20)
CALCIUM SERPL-MCNC: 9.9 MG/DL (ref 8.3–10.6)
CHLORIDE BLD-SCNC: 99 MMOL/L (ref 99–110)
CO2: 30 MMOL/L (ref 21–32)
CREAT SERPL-MCNC: 1 MG/DL (ref 0.9–1.3)
EOSINOPHILS ABSOLUTE: 0.3 K/UL (ref 0–0.6)
EOSINOPHILS RELATIVE PERCENT: 5.3 %
GFR AFRICAN AMERICAN: >60
GFR NON-AFRICAN AMERICAN: >60
GLUCOSE BLD-MCNC: 102 MG/DL (ref 70–99)
HCT VFR BLD CALC: 41.6 % (ref 40.5–52.5)
HEMOGLOBIN: 14.7 G/DL (ref 13.5–17.5)
LYMPHOCYTES ABSOLUTE: 2 K/UL (ref 1–5.1)
LYMPHOCYTES RELATIVE PERCENT: 40.7 %
MCH RBC QN AUTO: 31.1 PG (ref 26–34)
MCHC RBC AUTO-ENTMCNC: 35.4 G/DL (ref 31–36)
MCV RBC AUTO: 87.7 FL (ref 80–100)
MONOCYTES ABSOLUTE: 0.5 K/UL (ref 0–1.3)
MONOCYTES RELATIVE PERCENT: 9.9 %
NEUTROPHILS ABSOLUTE: 2.1 K/UL (ref 1.7–7.7)
NEUTROPHILS RELATIVE PERCENT: 43.4 %
PDW BLD-RTO: 13 % (ref 12.4–15.4)
PLATELET # BLD: 256 K/UL (ref 135–450)
PMV BLD AUTO: 8.5 FL (ref 5–10.5)
POTASSIUM REFLEX MAGNESIUM: 4.8 MMOL/L (ref 3.5–5.1)
RBC # BLD: 4.74 M/UL (ref 4.2–5.9)
SODIUM BLD-SCNC: 140 MMOL/L (ref 136–145)
WBC # BLD: 4.9 K/UL (ref 4–11)

## 2021-08-30 PROCEDURE — 85025 COMPLETE CBC W/AUTO DIFF WBC: CPT

## 2021-08-30 PROCEDURE — 6370000000 HC RX 637 (ALT 250 FOR IP): Performed by: INTERNAL MEDICINE

## 2021-08-30 PROCEDURE — 6360000002 HC RX W HCPCS: Performed by: INTERNAL MEDICINE

## 2021-08-30 PROCEDURE — 36415 COLL VENOUS BLD VENIPUNCTURE: CPT

## 2021-08-30 PROCEDURE — 2580000003 HC RX 258: Performed by: INTERNAL MEDICINE

## 2021-08-30 PROCEDURE — 80048 BASIC METABOLIC PNL TOTAL CA: CPT

## 2021-08-30 PROCEDURE — 99233 SBSQ HOSP IP/OBS HIGH 50: CPT | Performed by: INTERNAL MEDICINE

## 2021-08-30 RX ORDER — METHOCARBAMOL 500 MG/1
500 TABLET, FILM COATED ORAL 3 TIMES DAILY PRN
Qty: 9 TABLET | Refills: 0 | Status: SHIPPED | OUTPATIENT
Start: 2021-08-30 | End: 2021-09-02

## 2021-08-30 RX ORDER — SPIRONOLACTONE 25 MG/1
25 TABLET ORAL DAILY
Qty: 30 TABLET | Refills: 3 | Status: SHIPPED | OUTPATIENT
Start: 2021-08-31 | End: 2021-12-03 | Stop reason: SDUPTHER

## 2021-08-30 RX ORDER — FUROSEMIDE 20 MG/1
20 TABLET ORAL DAILY
Qty: 60 TABLET | Refills: 3 | Status: SHIPPED | OUTPATIENT
Start: 2021-08-31 | End: 2021-12-03 | Stop reason: SDUPTHER

## 2021-08-30 RX ORDER — ASPIRIN 81 MG/1
81 TABLET, CHEWABLE ORAL DAILY
Qty: 30 TABLET | Refills: 3 | Status: SHIPPED | OUTPATIENT
Start: 2021-09-03 | End: 2021-09-02 | Stop reason: ALTCHOICE

## 2021-08-30 RX ORDER — FUROSEMIDE 20 MG/1
20 TABLET ORAL DAILY
Status: DISCONTINUED | OUTPATIENT
Start: 2021-08-31 | End: 2021-08-30 | Stop reason: HOSPADM

## 2021-08-30 RX ORDER — CARVEDILOL 3.12 MG/1
3.12 TABLET ORAL 2 TIMES DAILY WITH MEALS
Qty: 60 TABLET | Refills: 3 | Status: SHIPPED | OUTPATIENT
Start: 2021-08-30 | End: 2021-12-03 | Stop reason: SDUPTHER

## 2021-08-30 RX ORDER — LOSARTAN POTASSIUM 25 MG/1
25 TABLET ORAL DAILY
Status: DISCONTINUED | OUTPATIENT
Start: 2021-08-30 | End: 2021-08-30 | Stop reason: HOSPADM

## 2021-08-30 RX ORDER — HYDROCODONE BITARTRATE AND ACETAMINOPHEN 5; 325 MG/1; MG/1
1 TABLET ORAL EVERY 8 HOURS PRN
Qty: 6 TABLET | Refills: 0 | Status: SHIPPED | OUTPATIENT
Start: 2021-08-30 | End: 2021-09-01

## 2021-08-30 RX ORDER — CARVEDILOL 3.12 MG/1
3.12 TABLET ORAL 2 TIMES DAILY WITH MEALS
Status: DISCONTINUED | OUTPATIENT
Start: 2021-08-30 | End: 2021-08-30 | Stop reason: HOSPADM

## 2021-08-30 RX ADMIN — MORPHINE SULFATE 2 MG: 2 INJECTION, SOLUTION INTRAMUSCULAR; INTRAVENOUS at 06:11

## 2021-08-30 RX ADMIN — ACETAMINOPHEN 1000 MG: 500 TABLET ORAL at 14:19

## 2021-08-30 RX ADMIN — CARVEDILOL 3.12 MG: 3.12 TABLET, FILM COATED ORAL at 10:51

## 2021-08-30 RX ADMIN — LOSARTAN POTASSIUM 25 MG: 25 TABLET, FILM COATED ORAL at 14:19

## 2021-08-30 RX ADMIN — SPIRONOLACTONE 25 MG: 25 TABLET, FILM COATED ORAL at 08:41

## 2021-08-30 RX ADMIN — Medication 10 ML: at 08:41

## 2021-08-30 RX ADMIN — ACETAMINOPHEN 1000 MG: 500 TABLET ORAL at 08:40

## 2021-08-30 RX ADMIN — Medication 10 ML: at 00:14

## 2021-08-30 RX ADMIN — ASPIRIN 81 MG: 81 TABLET, CHEWABLE ORAL at 08:40

## 2021-08-30 RX ADMIN — MORPHINE SULFATE 2 MG: 2 INJECTION, SOLUTION INTRAMUSCULAR; INTRAVENOUS at 00:15

## 2021-08-30 RX ADMIN — HYDRALAZINE HYDROCHLORIDE 10 MG: 10 TABLET, FILM COATED ORAL at 06:11

## 2021-08-30 RX ADMIN — FUROSEMIDE 40 MG: 10 INJECTION, SOLUTION INTRAMUSCULAR; INTRAVENOUS at 08:40

## 2021-08-30 ASSESSMENT — PAIN DESCRIPTION - DESCRIPTORS
DESCRIPTORS: ACHING
DESCRIPTORS: ACHING;RADIATING
DESCRIPTORS: ACHING
DESCRIPTORS: ACHING;RADIATING

## 2021-08-30 ASSESSMENT — PAIN DESCRIPTION - ORIENTATION
ORIENTATION: RIGHT

## 2021-08-30 ASSESSMENT — PAIN SCALES - GENERAL
PAINLEVEL_OUTOF10: 0
PAINLEVEL_OUTOF10: 8
PAINLEVEL_OUTOF10: 3
PAINLEVEL_OUTOF10: 8
PAINLEVEL_OUTOF10: 3
PAINLEVEL_OUTOF10: 8
PAINLEVEL_OUTOF10: 8

## 2021-08-30 ASSESSMENT — PAIN DESCRIPTION - ONSET
ONSET: ON-GOING

## 2021-08-30 ASSESSMENT — PAIN DESCRIPTION - LOCATION
LOCATION: GROIN

## 2021-08-30 ASSESSMENT — PAIN DESCRIPTION - FREQUENCY
FREQUENCY: CONTINUOUS

## 2021-08-30 ASSESSMENT — PAIN DESCRIPTION - PROGRESSION: CLINICAL_PROGRESSION: NOT CHANGED

## 2021-08-30 ASSESSMENT — PAIN DESCRIPTION - PAIN TYPE
TYPE: ACUTE PAIN

## 2021-08-30 ASSESSMENT — PAIN - FUNCTIONAL ASSESSMENT
PAIN_FUNCTIONAL_ASSESSMENT: PREVENTS OR INTERFERES SOME ACTIVE ACTIVITIES AND ADLS
PAIN_FUNCTIONAL_ASSESSMENT: PREVENTS OR INTERFERES SOME ACTIVE ACTIVITIES AND ADLS

## 2021-08-30 NOTE — PROGRESS NOTES
Pt left AMA via private vehicle driven by significant other. Pt escorted via wheelchair to main entrance and escorted into vehicle. Tolerated well. All personal belongings taken from room by pt.

## 2021-08-30 NOTE — PROGRESS NOTES
Pt refusing life vest. States \"I told them I ain't doing that. I sent them away. \" Dr Ari Maldonado notified via perfect serve. States if pt refusing life vest then he will be leaving AMA. This nurse and Remy Deal from [facility] attempted to convince pt to receive life vest. Pt still refusing. AMA paperwork signed by pt and placed in pt chart.

## 2021-08-30 NOTE — PROGRESS NOTES
Hospitalist Progress Note      PCP: No primary care provider on file. Date of Admission: 8/26/2021    Chief Complaint: Chest pain, CHF    Hospital Course: Underwent LHC, found to have severely reduced EF but no obstructive CAD. Has hx of drug abuse. Subjective:   Has some continued pain complaints, no new changes. Medications:  Reviewed    Infusion Medications    sodium chloride       Scheduled Medications    losartan  25 mg Oral Daily    carvedilol  3.125 mg Oral BID WC    [START ON 8/31/2021] furosemide  20 mg Oral Daily    acetaminophen  1,000 mg Oral TID    spironolactone  25 mg Oral Daily    sodium chloride flush  10 mL IntraVENous 2 times per day    enoxaparin  40 mg SubCUTAneous Daily    aspirin  81 mg Oral Daily     PRN Meds: morphine, methocarbamol, acetaminophen, sodium chloride flush, sodium chloride, ondansetron, polyethylene glycol, [DISCONTINUED] acetaminophen **OR** acetaminophen, perflutren lipid microspheres      Intake/Output Summary (Last 24 hours) at 8/30/2021 1129  Last data filed at 8/30/2021 1288  Gross per 24 hour   Intake 870 ml   Output 2025 ml   Net -1155 ml       Exam:    /78   Pulse 88   Temp 97.9 °F (36.6 °C) (Oral)   Resp 18   Ht 5' 7\" (1.702 m)   Wt 155 lb 13.8 oz (70.7 kg)   SpO2 95%   BMI 24.41 kg/m²     General appearance: No apparent distress, appears stated age and cooperative. HEENT: Pupils equal, round, and reactive to light. Conjunctivae/corneas clear. Neck: Supple, with full range of motion. No jugular venous distention. Trachea midline. Respiratory:  Normal respiratory effort. Clear to auscultation, bilaterally without Rales/Wheezes/Rhonchi. Cardiovascular: Regular rate and rhythm with normal S1/S2 without murmurs, rubs or gallops. Abdomen: Soft, non-tender, non-distended with normal bowel sounds. Musculoskelatal: No clubbing, cyanosis or edema bilaterally.     Skin: Skin color, texture, turgor normal.  No rashes or lesions. Neurologic:  Cranial nerves: II-XII intact, grossly non-focal.  Psychiatric: Alert and oriented, thought content appropriate, normal insight    Labs:   Recent Labs     08/28/21  0743 08/28/21  0743 08/28/21  1824 08/29/21  0712 08/30/21  0555   WBC 5.2  --   --  5.5 4.9   HGB 15.1   < > 14.8 14.5 14.7   HCT 43.9   < > 42.2 42.0 41.6     --   --  273 256    < > = values in this interval not displayed. Recent Labs     08/28/21  0743 08/29/21  0712 08/30/21  0555    138 140   K 3.5 3.8 4.8   CL 98* 97* 99   CO2 26 27 30   BUN 21* 22* 19   CREATININE 1.0 0.9 1.0   CALCIUM 9.6 9.5 9.9     No results for input(s): AST, ALT, BILIDIR, BILITOT, ALKPHOS in the last 72 hours. No results for input(s): INR in the last 72 hours. No results for input(s): Belinda Ly in the last 72 hours. Studies:  CT ABDOMEN PELVIS W WO CONTRAST Additional Contrast? None   Final Result      There is mild ill-defined hemorrhage in the right groin without focal hematoma or evidence of vascular injury. Nonspecific mild retroperitoneal lymphadenopathy. Borderline prostatomegaly.          XR CHEST PORTABLE   Final Result   Moderate pulmonary edema and less likely pneumonia          Assessment/Plan:    Active Hospital Problems    Diagnosis Date Noted    Dilated cardiomyopathy ([facility] [I42.0] 08/27/2021    Acute pulmonary edema ([address] [J81.0]     CHF with unknown LVEF ([address] [I50.9] 08/26/2021       Assessment & Plan:   Right groin hematoma  S/p cath, CT abdomen shows ill defined area of hemorrhage without focal hematoma or evidence of vascular injury.         Acute on chronic systolic heart failure   IV Lasix to continue, repeat echo, cardiology consultation, monitor on telemetry, troponins are negative, EF < 25%, continue on diuretics, s/p angiogram on 27 August patent coronaries, EF likely ~10%  He needs lifevest prior to dc     Chest pain  Likely dud to acute CHF  Work-up as above  Drug screen is positive for cocaine and cannabis    DVT Prophylaxis: Lovenox  Diet: ADULT DIET;  Regular; Low Fat/Low Chol/High Fiber/2 gm Na  Code Status: Full Code    PT/OT Eval Status:     Dispo - Inpatient will dc today     Lisa Becerra DO

## 2021-08-30 NOTE — DISCHARGE SUMMARY
(ALDACTONE) 25 MG tablet Take 1 tablet by mouth daily  Qty: 30 tablet, Refills: 3      methocarbamol (ROBAXIN) 500 MG tablet Take 1 tablet by mouth 3 times daily as needed (muscle spasms)  Qty: 9 tablet, Refills: 0      HYDROcodone-acetaminophen (NORCO) 5-325 MG per tablet Take 1 tablet by mouth every 8 hours as needed for Pain for up to 2 days. Intended supply: 3 days. Take lowest dose possible to manage pain  Qty: 6 tablet, Refills: 0    Comments: Reduce doses taken as pain becomes manageable  Associated Diagnoses: Groin pain, right           Current Discharge Medication List        Current Discharge Medication List        Current Discharge Medication List          Discharge ROS:  A complete review of systems was asked and negative except for groin pain at cath site. Discharge Exam:    /84   Pulse 77   Temp 98.1 °F (36.7 °C) (Oral)   Resp 16   Ht 5' 7\" (1.702 m)   Wt 155 lb 13.8 oz (70.7 kg)   SpO2 95%   BMI 24.41 kg/m²   General appearance:  NAD  HEENT:   Normal cephalic, atraumatic, moist mucous membranes, no oropharyngeal erythema or exudate  Neck: Supple, trachea midline, no anterior cervical or SC LAD  Heart[de-identified] Normal s1/s2, RRR, no murmurs, gallops, or rubs. No leg edema  Lungs:  Clear to auscultation bilaterally, no wheeze, rales or rhonchi, no use of accessory musclesNormal respiratory effort. Clear to auscultation, bilaterally without Rales/Wheezes/Rhonchi. Abdomen: Soft, non-tender, non-distended, bowel sounds present, no masses  Musculoskeletal:  No clubbing, no cyanosis, or edema  Skin: No lesion or masses  Neurologic:  Neurovascularly intact without any focal sensory/motor deficits. Cranial nerves: II-XII intact, grossly non-focal.  Psychiatric:  Alert and oriented, thought content appropriate    Labs:  For convenience and continuity at follow-up the following most recent labs are provided:    Lab Results   Component Value Date    WBC 4.9 08/30/2021    HGB 14.7 08/30/2021    HCT 41.6 pleural effusion vs. reflection. Definity contrast agent was used to help visualize endocardial borders. Signature   ------------------------------------------------------------------  Electronically signed by Tho Mares MD  (Interpreting physician) on 08/26/2021 at 05:54 PM  ------------------------------------------------------------------   Findings   Left Ventricle  Left ventricular enlargement. Mild concentric left ventricular hypertrophy. Severely decreased left ventricular systolic function with an estimated  ejection fraction of <25%. Severe global hypokinesis/akinesis. Indeterminate diastolic function. Mitral Valve  Thickened mitral valve leaflets. Mild mitral regurgitation. No evidence of mitral stenosis. Left Atrium  The left atrial size is normal.   Aortic Valve  Mildly calcified aortic valve. There is no significant aortic valve regurgitation or stenosis. Aorta  The aortic root appears normal.   Right Ventricle  The right ventricle is normal in size. Tapse: 2.28 cm. RV s: 10.3 cm/s   Tricuspid Valve  The tricuspid valve is normal in structure. Trace-mild tricuspid regurgitation. No evidence of tricuspid stenosis. Right Atrium  The right atrial size is normal.   Pulmonic Valve  The pulmonic valve is not well visualized. Trivial pulmonic regurgitation present. No evidence of pulmonic valve stenosis. Pericardial Effusion  No pericardial effusion noted. Pleural Effusion  Cannot rule out pleural effusion vs. reflection. Miscellaneous  IVC size is normal (<2.1cm) and collapses > 50% with respiration consistent  with normal RA pressure (3mmHg). Definity contrast agent was used to help visualize endocardial borders.   M-Mode/2D Measurements (cm)   LV Diastolic Dimension: 1.01 cm LV Systolic Dimension: 9.66 cm  LV Septum Diastolic: 7.13 cm  LV PW Diastolic: 1.3 cm         AO Root Dimension: 3.5 cm  RV Diastolic Dimension: 3.51 cm LA Dimension: 4.1 cm LA Area: 22.3 cm2                                  LA volume/Index: 66.3 ml /36 ml/m2  Doppler Measurements                                 MV Peak E-Wave: 87 cm/s   E' Septal Velocity: 3.16 cm/s                                MV Deceleration Time: 164 msec  PV Peak Velocity: 66.5 cm/s  PV Peak Gradient: 1.77 mmHg  Aorta   Aortic Root: 3.5 cm      CT ABDOMEN PELVIS W WO CONTRAST Additional Contrast? None    Result Date: 8/29/2021  EXAM: CT ABDOMEN PELVIS W WO CONTRAST INDICATION: groin hematoma after cath. COMPARISON: None TECHNIQUE: Standard per department protocol. Coronal and sagittal reconstructions were obtained. Up-to-date CT equipment and radiation dose reduction techniques were employed. CONTRAST: 80 mL Isovue-370 intravenous FINDINGS: : No additional findings. ABDOMEN/PELVIS: Lung bases: Clear. Mediastinum: Cardiomegaly. Liver and biliary system: Liver unremarkable. Gallbladder absent. Pancreas: Normal. Spleen: Normal. Adrenal glands: Normal. Genitourinary: No nephrolithiasis or hydronephrosis. Symmetric bilateral renal enhancement. Mild bladder wall thickening. Mild prostatomegaly. Bowel: The small and large bowel are normal in caliber. No bowel wall thickening. Moderate amount of colonic stool. Appendix normal. Abdominal wall: Normal. Peritoneum/retroperitoneum: No fluid collections are seen. No free air. Vasculature: Mild atherosclerosis of the aorta and its branch vessels. There is fat stranding centered about the right groin in the region of the right common femoral artery. No evidence of dissection or pseudoaneurysm in this region. There is no well-defined focal hematoma. Lymph nodes: Scattered mildly enlarged retroperitoneal lymph nodes, for example a 2.3 x 1.2 cm left para-aortic node (series 605, image 67). Osseous structures: Multilevel degenerative disc disease. Transitional lumbosacral segment.  Sclerosis in the right iliac bone and left sacrum, likely bone islands but nonspecific. There is mild ill-defined hemorrhage in the right groin without focal hematoma or evidence of vascular injury. Nonspecific mild retroperitoneal lymphadenopathy. Borderline prostatomegaly. XR CHEST PORTABLE    Result Date: 8/26/2021  Patient: Pawan Arreguin  Time Out: 04:42 Exam(s): FILM CXR 1 VIEW  EXAM:   XR Chest, 1 View  CLINICAL HISTORY:   shortness of breath. TECHNIQUE:   Frontal view of the chest.  COMPARISON:   No relevant prior studies available. FINDINGS:   Lungs: Moderate amount of airspace opacities in both lungs with central distribution. Pleural space:  Unremarkable. No pneumothorax. Heart:  Mild cardiomegaly. Mediastinum:  Unremarkable. Bones/joints: No acute findings. Electronically signed by Mark Dodd M.D. on 08-26-21 at 0779    Moderate pulmonary edema and less likely pneumonia            The patient was seen and examined on day of discharge and this discharge summary is in conjunction with any daily progress note from day of discharge. Time Spent on discharge is more than 30 minutes in the examination, evaluation, counseling and review of medications and discharge plan. SignedKeeley Elam DO   8/30/2021      Thank you No primary care provider on file. for the opportunity to be involved in this patient's care. If you have any questions or concerns please feel free to contact me at [facility].
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
## 2021-08-30 NOTE — CARE COORDINATION
CM following for  D/C planning Patient with d/c order agreeable to [facility]  But  Will not  Accept the Life Vest at  D/C:  He states \" I don't Need that \",  CM and  And RN Chelsie Echols  Explained the need and rationale again to patient and encouraged  Him to be compliant:  He  Continued to refuse. Patient  recev'd Meds to beds  >       these medications from any pharmacy with your printed prescription     aspirin  carvedilol  furosemide  HYDROcodone-acetaminophen  methocarbamol  spironolactone     Will not be lucille to set up  [facility]  If  Patient  Leaves  AMA  . Patient acknowledges  He states he will follow up with is  Cardiology  as instructed  Out patient . Electronically signed by Olive Gordon RN on 8/30/2021 at 5:04 PM         Olive Gordon RN Case Manager  [facility]  [address] [place] [address].   [facility] [address]  [phone]  Fax [phone]

## 2021-08-30 NOTE — PLAN OF CARE
Problem: OXYGENATION/RESPIRATORY FUNCTION  Goal: Patient will maintain patent airway  Outcome: Met This Shift  Note: Patient maintained a patent airway during shift. Problem: OXYGENATION/RESPIRATORY FUNCTION  Goal: Patient will achieve/maintain normal respiratory rate/effort  Description: Respiratory rate and effort will be within normal limits for the patient  Outcome: Met This Shift  Note: Pt's respiratory rate normal during shift. Problem: HEMODYNAMIC STATUS  Goal: Patient has stable vital signs and fluid balance  Outcome: Met This Shift  Note: Pt's vital signs normal during shift. Problem: Falls - Risk of:  Goal: Will remain free from falls  Description: Will remain free from falls  Outcome: Met This Shift  Note: Pt remained free from falls during shift. Pt's bed is locked in lowest position with alarm on, call light, bedside table, and personal belongings within reach. Problem: Falls - Risk of:  Goal: Absence of physical injury  Description: Absence of physical injury  Outcome: Met This Shift  Note: Pt remained free from physical injury during shift. Problem: Pain:  Goal: Control of chronic pain  Description: Control of chronic pain  Outcome: Met This Shift  Note: Pt did not complain of any chronic pain during shift. Problem: Pain:  Goal: Pain level will decrease  Description: Pain level will decrease  Outcome: Ongoing  Note: Pt complained of acute pain to R groin around heart cath site. Pt received PRN morphine which helped make him more comfortable. Problem: Pain:  Goal: Control of acute pain  Description: Control of acute pain  Outcome: Ongoing  Note: Pt complained of acute pain to R groin around heart cath site. Pt received PRN morphine which helped make him more comfortable.
Problem: OXYGENATION/RESPIRATORY FUNCTION  Goal: Patient will maintain patent airway  Outcome: Met This Shift  Note: Pt maintained a patent airway during shift. Problem: OXYGENATION/RESPIRATORY FUNCTION  Goal: Patient will achieve/maintain normal respiratory rate/effort  Description: Respiratory rate and effort will be within normal limits for the patient  Outcome: Met This Shift  Note: Pt's respiratory rate normal during shift. Problem: HEMODYNAMIC STATUS  Goal: Patient has stable vital signs and fluid balance  Outcome: Met This Shift  Note: Pt's vital signs normal during shift. Problem: Falls - Risk of:  Goal: Will remain free from falls  Description: Will remain free from falls  Outcome: Met This Shift  Note: Pt remained free from falls during shift. Pt's bed is locked in lowest position with call light, bedside table, and personal belongings within reach. Problem: Falls - Risk of:  Goal: Absence of physical injury  Description: Absence of physical injury  Outcome: Met This Shift  Note: Pt remained free from physical injury during shift. Problem: Pain:  Goal: Control of chronic pain  Description: Control of chronic pain  Outcome: Met This Shift  Note: Pt did not complain chronic pain during shift. Problem: Pain:  Goal: Pain level will decrease  Description: Pain level will decrease  Outcome: Ongoing  Note: Pt complained of acute pain in his right groin. Pt received scheduled tylenol 1000mg and PRN morphine 1mg to help control pain. Problem: Pain:  Goal: Control of acute pain  Description: Control of acute pain  Outcome: Ongoing  Note: Pt complained of acute pain in his right groin. Pt received scheduled tylenol 1000mg and PRN morphine 1mg to help control pain.
Problem: OXYGENATION/RESPIRATORY FUNCTION  Goal: Patient will maintain patent airway  Outcome: Met This Shift  Note: Pt maintained a patent airway during shift. Problem: OXYGENATION/RESPIRATORY FUNCTION  Goal: Patient will achieve/maintain normal respiratory rate/effort  Description: Respiratory rate and effort will be within normal limits for the patient  Outcome: Met This Shift  Note: Pt's respiratory rate normal, pt did complain of SOB a few times. SpO2 above 93% throughout shift. Problem: Falls - Risk of:  Goal: Will remain free from falls  Description: Will remain free from falls  Outcome: Met This Shift  Note: Pt remained free from falls during shift. Pt's bed is locked in lowest position with alarm on, call light, bedside table, and personal belongings within reach. Problem: Falls - Risk of:  Goal: Absence of physical injury  Description: Absence of physical injury  Outcome: Met This Shift  Note: Pt remained free from physical injury during shift. Problem: Pain:  Goal: Control of chronic pain  Description: Control of chronic pain  Outcome: Met This Shift  Note: Pt did not complain of any chronic pain during shift. Problem: Pain:  Goal: Pain level will decrease  Description: Pain level will decrease  Outcome: Ongoing  Note: Pt complained of some chest pain and shoulder pain during shift. Pt admitted with this pain. Pt received one time dose of toradol which he said helped make him more comfortable. Problem: Pain:  Goal: Control of acute pain  Description: Control of acute pain  Outcome: Ongoing  Note: Pt complained of some chest pain and shoulder pain during shift. Pt admitted with this pain. Pt received one time dose of toradol which he said helped make him more comfortable.
Problem: OXYGENATION/RESPIRATORY FUNCTION  Goal: Patient will maintain patent airway  Outcome: Ongoing     Problem: OXYGENATION/RESPIRATORY FUNCTION  Goal: Patient will achieve/maintain normal respiratory rate/effort  Description: Respiratory rate and effort will be within normal limits for the patient  Outcome: Ongoing     Problem: HEMODYNAMIC STATUS  Goal: Patient has stable vital signs and fluid balance  Outcome: Ongoing     Problem: FLUID AND ELECTROLYTE IMBALANCE  Goal: Fluid and electrolyte balance are achieved/maintained  Outcome: Ongoing     Problem: ACTIVITY INTOLERANCE/IMPAIRED MOBILITY  Goal: Mobility/activity is maintained at optimum level for patient  Outcome: Ongoing     Problem: Falls - Risk of:  Goal: Will remain free from falls  Description: Will remain free from falls  Outcome: Ongoing     Problem: Falls - Risk of:  Goal: Absence of physical injury  Description: Absence of physical injury  Outcome: Ongoing     Problem: Pain:  Goal: Pain level will decrease  Description: Pain level will decrease  Outcome: Ongoing     Problem: Pain:  Goal: Control of acute pain  Description: Control of acute pain  Outcome: Ongoing     Problem: Pain:  Goal: Control of chronic pain  Description: Control of chronic pain  Outcome: Ongoing
Rt femoral cath site remains CDI, without hematoma, distal pulses +3 and extremity warm to touch. Pt denies any pain, VSS. Will continue to monitor. Problem: HEMODYNAMIC STATUS  Goal: Patient has stable vital signs and fluid balance  8/28/2021 0305 by Sarah Looney RN  Outcome: Ongoing  VSS     Problem: Falls - Risk of:  Goal: Will remain free from falls  Description: Will remain free from falls  8/28/2021 0305 by Sarah Looney RN  Outcome: Ongoing  Patient at risk for falls. Patient resting quietly in bed. Side rails up x 2. Bed locked in lowest position. Bedside table and call light within reach. Patient instructed to call for assistance. Patient verbalized understanding and calls appropriately. Will continue to monitor. Problem: Pain:  Goal: Pain level will decrease  Description: Pain level will decrease  8/28/2021 0305 by Sarah Looney RN  Outcome: Ongoing  Denies any c/o pain, will continue to monitor.
complained of acute pain in his right groin. Pt received scheduled tylenol 1000mg and PRN morphine 1mg to help control pain. Goal: Control of acute pain  Description: Control of acute pain  8/30/2021 1145 by Daisy Nyhan, RN  Outcome: Completed  8/30/2021 0427 by Alicia Carballo RN  Outcome: Ongoing  Note: Pt complained of acute pain in his right groin. Pt received scheduled tylenol 1000mg and PRN morphine 1mg to help control pain. Goal: Control of chronic pain  Description: Control of chronic pain  8/30/2021 1145 by Daisy Nyhan, RN  Outcome: Completed  8/30/2021 0427 by Alicia Carballo RN  Outcome: Met This Shift  Note: Pt did not complain chronic pain during shift.

## 2021-08-30 NOTE — PROGRESS NOTES
Cardiology Consult Service  Daily Progress Note        Admit Date:  2021  Primary cardiologist: Dr Luz Arreaga, new    Reason for Consultation/Chief Complaint: AHF, chest pains. Subjective:      Randi Medina is a 62 y.o. male with a past medical history of poor compliance, past smoker, cocaine use, HTN, HFrEF (diagnosed in 2019, left AMA). Echo: (2021)  Severely decreased left ventricular systolic function with an estimated ejection fraction of <25%. Left ventricular enlargement. Mild concentric left ventricular hypertrophy. Severe global hypokinesis/akinesis. Indeterminate diastolic function. Mild MR. Trace-mild TR. Mildly calcified aortic valve. The pulmonic valve is not well visualized. Trivial pulmonic regurgitation present.     Echo: (2019)  The EF is visually estimated to be 10%. Moderate LV dilatation with severe LV dysfunction ejection fraction of 15% estimated, severe global hypokinesis, diastolic function consistent with elevated LV filling pressures Left and Right Atrium with moderate enlargement. Mild mitral regurgitation. Trace tricuspid regurgitation.     NM Stress test: (2019)  No definite myocardial ischemia or scar. Dilated LV cavity with global hypokinesis. Low LVEF of 14%.      EK2021  NSR     Patient presented to ED due to chest tightness that started suddenly. Reported 7/10, worse with lying flat and improved with sitting up. Patient had been experiencing increased SOB and fatigue recently. Patient was brought in by EMS. Of note, patient was diagnosed with heart failure in 2019 at [facility], echo at that time showed EF of 10%. He has been non-compliant with medications since and does not have good insight on disease.     In ED, EKG showed NSR. CXR in ED moderate pulmonary edema. Trops were 0.01 x2. pro-BNP was elevated at 2012. Patient was given 40 IV of lasix. UDS positive for cocaine. We were consulted by Dr. Reyes Sharma for heart failure management.     [facility] no lesions       Nose: Nares normal, no drainage or sinus tenderness   Throat: Lips, mucosa, and tongue normal   Neck: Supple, symmetrical, trachea midline, no adenopathy, thyroid: not enlarged, symmetric, no tenderness/mass/nodules, no carotid bruit. Lungs:   Normal respiratory rate, lungs clear to auscultation without any wheezes, rubs or ronchi bilaterally. Chest Wall:  No tenderness or deformity   Heart:  Regular rhythm, rate is controlled, S1, S2 normal, there is no murmur, there is no rub or gallop, no jvd, no bilateral lower extremity edema   Abdomen:   Soft, non-tender, bowel sounds active all four quadrants,  no masses, no organomegaly       Extremities: Extremities normal, atraumatic, no cyanosis. Pulses: 2+ and symmetric   Skin: Skin color, texture, turgor normal, no rashes or lesions   Pysch: Normal mood and affect   Neurologic: Normal gross motor and sensory exam.  Cranial nerves intact       Labs:   Recent Labs     08/28/21  0743 08/29/21  0712 08/30/21  0555    138 140   K 3.5 3.8 4.8   BUN 21* 22* 19   CREATININE 1.0 0.9 1.0   CL 98* 97* 99   CO2 26 27 30   GLUCOSE 110* 131* 102*   CALCIUM 9.6 9.5 9.9   MG 2.20  --   --      Recent Labs     08/28/21  0743 08/28/21  0743 08/28/21  1824 08/29/21  0712 08/29/21  0712 08/30/21  0555   WBC 5.2  --   --  5.5  --  4.9   HGB 15.1   < > 14.8 14.5  --  14.7   HCT 43.9   < > 42.2 42.0  --  41.6     --   --  273  --  256   MCV 88.4   < >  --  87.5   < > 87.7    < > = values in this interval not displayed. No results for input(s): CHOLTOT, TRIG, HDL in the last 72 hours. Invalid input(s): LIPIDCOMM, CHOLHDL, VLDCHOL, LDL  No results for input(s): PTT, INR in the last 72 hours. Invalid input(s): PT  No results for input(s): CKTOTAL, CKMB, CKMBINDEX, TROPONINI in the last 72 hours. No results for input(s): BNP in the last 72 hours. No results for input(s): NTPROBNP in the last 72 hours.   No results for input(s): TSH in the last 72 hours. Imaging:       Assessment & Plan:     1. Acute on chronic HFrEF:  In view of normal coronaries on [facility], systolic heart failure is nonischemic in etiology and most likely due to cocaine use, cannot exclude concomitant severe hypertension. Patient was volume overloaded on admission. He is now euvolemic and hemodynamically stable. He is NYHA class II, stage C.     -We will change IV Lasix to Lasix 20 mg daily  -Start Coreg 3.125 twice daily (not contraindicated in the setting of prior cocaine use)  -We will start losartan 25 mg daily  -We will stop hydralazine  -Continue with spironolactone 25 daily  -Patient may be released home today with a LifeVest.  -Stop cocaine, avoid smoking and alcohol  -We will consider starting SGLT2 inhibitors for her HFrEF as outpatient in the clinic.   - If LVEF remains < 35% after 90 days of GDMT, will discuss referral to EP for possible device placement. 2.  Essential hypertension:  Patient's blood pressure is well controlled with current medications.    -Continue medications per #1  -Avoid salt    3. Cocaine use:  I have strongly advised patient to avoid drugs. Patient may be discharged home today on the above meds and follow up with me in 1 week with a BMP prior to visit. Please call me with any questions. I have spent 35 minutes of face to face time with the patient with more than 50% spent counseling and coordinating care. I have personally reviewed the reports and images of labs, radiological studies, cardiac studies including ECG's and telemetry, current and old medical records. The note was completed using EMR and Dragon dictation system. Every effort was made to ensure accuracy; however, inadvertent computerized transcription errors may be present. All questions and concerns were addressed to the patient/family. Alternatives to my treatment were discussed. Thank you for allowing to us to participate in the asha or Leilani Heaton.  Please call our service with questions.     Estella Spring MD, [facility], [address]  The [address]  [address] [facility] [address] [id number]  Ph: [phone]  Fax: [phone]

## 2021-09-02 ENCOUNTER — OFFICE VISIT (OUTPATIENT)
Dept: CARDIOLOGY CLINIC | Age: 61
End: 2021-09-02
Payer: COMMERCIAL

## 2021-09-02 VITALS
WEIGHT: 161.4 LBS | HEART RATE: 94 BPM | BODY MASS INDEX: 25.28 KG/M2 | SYSTOLIC BLOOD PRESSURE: 118 MMHG | DIASTOLIC BLOOD PRESSURE: 80 MMHG

## 2021-09-02 DIAGNOSIS — I50.9 CHF WITH UNKNOWN LVEF (HCC): Primary | ICD-10-CM

## 2021-09-02 DIAGNOSIS — Z79.899 LONG TERM USE OF DRUG: ICD-10-CM

## 2021-09-02 PROCEDURE — 1111F DSCHRG MED/CURRENT MED MERGE: CPT | Performed by: INTERNAL MEDICINE

## 2021-09-02 PROCEDURE — 1036F TOBACCO NON-USER: CPT | Performed by: INTERNAL MEDICINE

## 2021-09-02 PROCEDURE — G8419 CALC BMI OUT NRM PARAM NOF/U: HCPCS | Performed by: INTERNAL MEDICINE

## 2021-09-02 PROCEDURE — 99214 OFFICE O/P EST MOD 30 MIN: CPT | Performed by: INTERNAL MEDICINE

## 2021-09-02 PROCEDURE — 3017F COLORECTAL CA SCREEN DOC REV: CPT | Performed by: INTERNAL MEDICINE

## 2021-09-02 PROCEDURE — G8427 DOCREV CUR MEDS BY ELIG CLIN: HCPCS | Performed by: INTERNAL MEDICINE

## 2021-09-02 RX ORDER — LOSARTAN POTASSIUM 25 MG/1
25 TABLET ORAL DAILY
Qty: 90 TABLET | Refills: 3 | Status: SHIPPED | OUTPATIENT
Start: 2021-09-02 | End: 2021-12-03 | Stop reason: SDUPTHER

## 2021-09-02 NOTE — PROGRESS NOTES
Cc: HFrEF due to [facility] (cocaine)    HPI:     Patient is a 64-year-old man with history of HFrEF due to [facility], cocaine use, past smoker, poor compliance (had left [facility] in 2019 AMA). Patient presented to the [facility] hospital 8/26-8/30/2021 chest pain and shortness of breath and was admitted for AHF and r/o ACS. UDS was positive for cocaine. Troponin x2 negative, proBNP 2000. Echo 08/2019 at [facility]: mod LVD, EF 10%, mild valve disease and normal RV. Nuc 08/2019 at [facility]: LVD, EF 14%, no perfusion defects. Echo 08/2021: LVD with LVEF < 25%, indeterminate diastolic, normal RV and mild valve disease. ECG 8/26/21: NSR, poor R wave progression.      LHC 8/27/21: normal coronaries, EF 10%. It was complicated by a small right groin hematoma (CT abdomen pelvis negative for retroperitoneal bleed or significant hematoma formation)    Patient is here with his wife Jalyn Flanagan for follow-up after his recent hospitalization. He is compliant with his medications. His weight is stable around 155 pounds. BP at home 120-130/80 mmHg. He was wondering if he could take heart failure medications for only a limited period of time and then quit. He reports no symptoms at rest or with exertion. Histories     Past Medical History:   has a past medical history of Systolic CHF ([address] Surgical History:   has no past surgical history on file. Social History:   reports that he has quit smoking. He has never used smokeless tobacco. He reports previous alcohol use. He reports current drug use. Drug: Marijuana. Family History:  No evidence for sudden cardiac death or premature CAD      Medications:     Home medications were reviewed and are listed below    Prior to Admission medications    Medication Sig Start Date End Date Taking?  Authorizing Provider   losartan (COZAAR) 25 MG tablet Take 1 tablet by mouth daily 9/2/21  Yes Emma Knowles MD   carvedilol (COREG) 3.125 MG tablet Take 1 tablet by mouth 2 times daily (with meals) 8/30/21  Yes Lisacarlyn Becerra, DO   furosemide (LASIX) 20 MG tablet Take 1 tablet by mouth daily 8/31/21  Yes Lisa Black, DO   spironolactone (ALDACTONE) 25 MG tablet Take 1 tablet by mouth daily 8/31/21  Yes Lisa Black, DO   methocarbamol (ROBAXIN) 500 MG tablet Take 1 tablet by mouth 3 times daily as needed (muscle spasms) 8/30/21 9/2/21 Yes Lani Serum, DO          Allergy:     Patient has no known allergies. Review of Systems:     All 12 point review of symptoms completed. Pertinent positives identified in the HPI, all other review of symptoms negative as below. CONSTITUTIONAL: No fatigue  SKIN: No rash or pruritis. EYES: No visual changes or diplopia. No scleral icterus. ENT: No Headaches, hearing loss or vertigo. No mouth sores or sore throat. CARDIOVASCULAR: No chest pain/chest pressure/chest discomfort. No palpitations. No edema. RESPIRATORY: No dyspnea. No cough or wheezing, no sputum production. GASTROINTESTINAL: No N/V/D. No abdominal pain, appetite loss, blood in stools. GENITOURINARY: No dysuria, trouble voiding, or hematuria. MUSCULOSKELETAL:  No gait disturbance, weakness or joint complaints. NEUROLOGICAL: No headache, diplopia, change in muscle strength, numbness or tingling. No change in gait, balance, coordination, mood, affect, memory, mentation, behavior. PSHYCH: No anxiety, loss of interest, change in sexual behavior, feelings of self-harm, or confusion. ENDOCRINE: No excessive thirst, fluid intake, or urination. No tremor. HEMATOLOGIC: No abnormal bruising or bleeding. ALLERGY: No nasal congestion or hives.       Physical Examination:     Vitals:    09/02/21 0937   BP: 118/80   Pulse: 94   Weight: 161 lb 6.4 oz (73.2 kg)       Wt Readings from Last 3 Encounters:   09/02/21 161 lb 6.4 oz (73.2 kg)   08/30/21 155 lb 13.8 oz (70.7 kg)         General Appearance:  Alert, cooperative, no distress, appears stated age Appropriate weight   Head:  Normocephalic, without obvious abnormality, atraumatic   Eyes:  PERRL, conjunctiva/corneas clear EOM intact  Ears normal   Throat no lesions       Nose: Nares normal, no drainage or sinus tenderness   Throat: Lips, mucosa, and tongue normal   Neck: Supple, symmetrical, trachea midline, no adenopathy, thyroid: not enlarged, symmetric, no tenderness/mass/nodules, no carotid bruit       Lungs:   Clear to auscultation bilaterally, respirations unlabored   Chest Wall:  No tenderness or deformity   Heart:  Regular rhythm, rate is controlled, S1, S2 normal, there is no murmur, there is no rub or gallop, no jvd, no bilateral lower extremity edema   Abdomen:   Soft, non-tender, bowel sounds active all four quadrants,  no masses, no organomegaly       Extremities: Extremities normal, atraumatic, no cyanosis   Pulses: 2+ and symmetric   Skin: Skin color, texture, turgor normal, no rashes or lesions   Pysch: Normal mood and affect   Neurologic: Normal gross motor and sensory exam.  Cranial nerves intact        Labs:     Lab Results   Component Value Date    WBC 4.9 08/30/2021    HGB 14.7 08/30/2021    HCT 41.6 08/30/2021    MCV 87.7 08/30/2021     08/30/2021     Lab Results   Component Value Date     08/30/2021    K 4.8 08/30/2021    CL 99 08/30/2021    CO2 30 08/30/2021    BUN 19 08/30/2021    CREATININE 1.0 08/30/2021    GLUCOSE 102 (H) 08/30/2021    CALCIUM 9.9 08/30/2021    LABGLOM >60 08/30/2021    GFRAA >60 08/30/2021         No results found for: CHOL  No results found for: TRIG  No results found for: HDL  No results found for: LDLCHOLESTEROL, LDLCALC  No results found for: LABVLDL, VLDL  No results found for: CHOLHDLRATIO    No results found for: INR, PROTIME    The ASCVD Risk score (Jolanta Glover, et al., 2013) failed to calculate for the following reasons:    Cannot find a previous HDL lab    Cannot find a previous total cholesterol lab      Assessment / Plan:      Diagnosis Orders   1.  CHF with unknown LVEF ([address]  BASIC [address] [address] Phone: [phone]  Heart Failure Hotline: [phone]  Fax: [phone]

## 2021-12-03 ENCOUNTER — OFFICE VISIT (OUTPATIENT)
Dept: CARDIOLOGY CLINIC | Age: 61
End: 2021-12-03
Payer: COMMERCIAL

## 2021-12-03 VITALS
BODY MASS INDEX: 25.06 KG/M2 | SYSTOLIC BLOOD PRESSURE: 108 MMHG | HEART RATE: 76 BPM | WEIGHT: 160 LBS | DIASTOLIC BLOOD PRESSURE: 70 MMHG

## 2021-12-03 DIAGNOSIS — I50.22 CHRONIC HFREF (HEART FAILURE WITH REDUCED EJECTION FRACTION) (HCC): ICD-10-CM

## 2021-12-03 DIAGNOSIS — I50.9 CHF WITH UNKNOWN LVEF (HCC): Primary | ICD-10-CM

## 2021-12-03 DIAGNOSIS — I50.9 CHF WITH UNKNOWN LVEF (HCC): ICD-10-CM

## 2021-12-03 LAB
ANION GAP SERPL CALCULATED.3IONS-SCNC: 16 MMOL/L (ref 3–16)
BUN BLDV-MCNC: 10 MG/DL (ref 7–20)
CALCIUM SERPL-MCNC: 9.6 MG/DL (ref 8.3–10.6)
CHLORIDE BLD-SCNC: 102 MMOL/L (ref 99–110)
CO2: 24 MMOL/L (ref 21–32)
CREAT SERPL-MCNC: 1.2 MG/DL (ref 0.9–1.3)
GFR AFRICAN AMERICAN: >60
GFR NON-AFRICAN AMERICAN: >60
GLUCOSE BLD-MCNC: 84 MG/DL (ref 70–99)
POTASSIUM SERPL-SCNC: 3.7 MMOL/L (ref 3.5–5.1)
SODIUM BLD-SCNC: 142 MMOL/L (ref 136–145)

## 2021-12-03 PROCEDURE — 3017F COLORECTAL CA SCREEN DOC REV: CPT | Performed by: INTERNAL MEDICINE

## 2021-12-03 PROCEDURE — 1036F TOBACCO NON-USER: CPT | Performed by: INTERNAL MEDICINE

## 2021-12-03 PROCEDURE — G8484 FLU IMMUNIZE NO ADMIN: HCPCS | Performed by: INTERNAL MEDICINE

## 2021-12-03 PROCEDURE — G8427 DOCREV CUR MEDS BY ELIG CLIN: HCPCS | Performed by: INTERNAL MEDICINE

## 2021-12-03 PROCEDURE — G8419 CALC BMI OUT NRM PARAM NOF/U: HCPCS | Performed by: INTERNAL MEDICINE

## 2021-12-03 PROCEDURE — 99214 OFFICE O/P EST MOD 30 MIN: CPT | Performed by: INTERNAL MEDICINE

## 2021-12-03 RX ORDER — SPIRONOLACTONE 25 MG/1
25 TABLET ORAL DAILY
Qty: 90 TABLET | Refills: 3 | Status: SHIPPED | OUTPATIENT
Start: 2021-12-03

## 2021-12-03 RX ORDER — FUROSEMIDE 20 MG/1
20 TABLET ORAL DAILY
Qty: 90 TABLET | Refills: 3 | Status: SHIPPED | OUTPATIENT
Start: 2021-12-03

## 2021-12-03 RX ORDER — CARVEDILOL 3.12 MG/1
3.12 TABLET ORAL 2 TIMES DAILY WITH MEALS
Qty: 180 TABLET | Refills: 3 | Status: SHIPPED | OUTPATIENT
Start: 2021-12-03

## 2021-12-03 RX ORDER — LOSARTAN POTASSIUM 25 MG/1
25 TABLET ORAL DAILY
Qty: 90 TABLET | Refills: 3 | Status: SHIPPED | OUTPATIENT
Start: 2021-12-03

## 2021-12-03 NOTE — PROGRESS NOTES
Cc: HFrEF due to [facility] (cocaine)    HPI:     Patient is a 31-year-old man with history of HFrEF due to [facility], cocaine use, past smoker, poor compliance (had left [facility] in 2019 AMA).      Patient presented to the [facility] 8/268/30/2021 chest pain and shortness of breath and was admitted for AHF and r/o ACS. UDS was positive for cocaine. Troponin x2 negative, proBNP 2000.     Echo 08/2019 at [facility]: mod LVD, EF 10%, mild valve disease and normal RV.      Nuc 08/2019 at [facility]: LVD, EF 14%, no perfusion defects.      Echo 08/2021: LVD with LVEF < 25%, indeterminate diastolic, normal RV and mild valve disease.      ECG 8/26/21: NSR, poor R wave progression.      LHC 8/27/21: normal coronaries, EF 10%.   It was complicated by a small right groin hematoma (CT abdomen pelvis negative for retroperitoneal bleed or significant hematoma formation)     Patient is here with his wife Earnest for follow-up after his recent hospitalization. He is compliant with his medications. He tries to cut down on cocaine (last use was about 1 week ago), smokes weed. His SBP 100s, HR 70s, weight stable around 157 lbs, avoids salt, is compliant with his meds but takes his coreg 2 tabs in am instead of one bid. Histories     Past Medical History:   has a past medical history of Systolic CHF ([address] Surgical History:   has no past surgical history on file. Social History:   reports that he has quit smoking. He has never used smokeless tobacco. He reports previous alcohol use. He reports current drug use. Drug: Marijuana Maurita Handsome). Family History:  No evidence for sudden cardiac death or premature CAD      Medications:     Home medications were reviewed and are listed below    Prior to Admission medications    Medication Sig Start Date End Date Taking?  Authorizing Provider   losartan (COZAAR) 25 MG tablet Take 1 tablet by mouth daily 12/3/21  Yes Baylee Perry MD   carvedilol (COREG) 3.125 MG tablet Take 1 tablet by mouth 2 times daily (with meals) 12/3/21  Yes Brandon Oneal MD   furosemide (LASIX) 20 MG tablet Take 1 tablet by mouth daily 12/3/21  Yes Brandon Oneal MD   spironolactone (ALDACTONE) 25 MG tablet Take 1 tablet by mouth daily 12/3/21  Yes Brandon Oneal MD          Allergy:     Patient has no known allergies. Review of Systems:     All 12 point review of symptoms completed. Pertinent positives identified in the HPI, all other review of symptoms negative as below. CONSTITUTIONAL: No fatigue  SKIN: No rash or pruritis. EYES: No visual changes or diplopia. No scleral icterus. ENT: No Headaches, hearing loss or vertigo. No mouth sores or sore throat. CARDIOVASCULAR: No chest pain/chest pressure/chest discomfort. No palpitations. No edema. RESPIRATORY: No dyspnea. No cough or wheezing, no sputum production. GASTROINTESTINAL: No N/V/D. No abdominal pain, appetite loss, blood in stools. GENITOURINARY: No dysuria, trouble voiding, or hematuria. MUSCULOSKELETAL:  No gait disturbance, weakness or joint complaints. NEUROLOGICAL: No headache, diplopia, change in muscle strength, numbness or tingling. No change in gait, balance, coordination, mood, affect, memory, mentation, behavior. PSHYCH: No anxiety, loss of interest, change in sexual behavior, feelings of self-harm, or confusion. ENDOCRINE: No excessive thirst, fluid intake, or urination. No tremor. HEMATOLOGIC: No abnormal bruising or bleeding. ALLERGY: No nasal congestion or hives.       Physical Examination:     Vitals:    12/03/21 1359   BP: 108/70   Pulse: 76   Weight: 160 lb (72.6 kg)       Wt Readings from Last 3 Encounters:   12/03/21 160 lb (72.6 kg)   09/02/21 161 lb 6.4 oz (73.2 kg)   08/30/21 155 lb 13.8 oz (70.7 kg)         General Appearance:  Alert, cooperative, no distress, appears stated age Appropriate weight   Head:  Normocephalic, without obvious abnormality, atraumatic   Eyes:  PERRL, conjunctiva/corneas clear EOM intact  Ears normal   Throat no lesions       Nose: Nares normal, no drainage or sinus tenderness   Throat: Lips, mucosa, and tongue normal   Neck: Supple, symmetrical, trachea midline, no adenopathy, thyroid: not enlarged, symmetric, no tenderness/mass/nodules, no carotid bruit       Lungs:   Clear to auscultation bilaterally, respirations unlabored   Chest Wall:  No tenderness or deformity   Heart:  Regular rhythm, rate is controlled, S1, S2 normal, there is no murmur, there is no rub or gallop, cannot assess jvd, no bilateral lower extremity edema   Abdomen:   Soft, non-tender, bowel sounds active all four quadrants,  no masses, no organomegaly       Extremities: Extremities normal, atraumatic, no cyanosis   Pulses: 2+ and symmetric   Skin: Skin color, texture, turgor normal, no rashes or lesions   Pysch: Normal mood and affect   Neurologic: Normal gross motor and sensory exam.  Cranial nerves intact        Labs:     Lab Results   Component Value Date    WBC 4.9 08/30/2021    HGB 14.7 08/30/2021    HCT 41.6 08/30/2021    MCV 87.7 08/30/2021     08/30/2021     Lab Results   Component Value Date     08/30/2021    K 4.8 08/30/2021    CL 99 08/30/2021    CO2 30 08/30/2021    BUN 19 08/30/2021    CREATININE 1.0 08/30/2021    GLUCOSE 102 (H) 08/30/2021    CALCIUM 9.9 08/30/2021    LABGLOM >60 08/30/2021    GFRAA >60 08/30/2021         No results found for: CHOL  No results found for: TRIG  No results found for: HDL  No results found for: LDLCHOLESTEROL, LDLCALC  No results found for: LABVLDL, VLDL  No results found for: CHOLHDLRATIO    No results found for: INR, PROTIME    The ASCVD Risk score (Rashard Ron, et al., 2013) failed to calculate for the following reasons:    Cannot find a previous HDL lab    Cannot find a previous total cholesterol lab      Assessment / Plan:      Diagnosis Orders   1. CHF with unknown LVEF ([facility]  Basic Metabolic Panel   2.  Chronic HFrEF (heart failure with reduced ejection fraction) ([facility]          1. Chronic HFrEF:  It is nonischemic in etiology in view of normal coronaries per [facility], most likely due to cocaine use. His NYHA FC is I, stage C. He is euvolemic and hemo stable.      I have strongly advised patient to avoid using cocaine again. I emphasized to take Coreg 3.125 twice daily (not 6.25 daily)   -Cw losartan 25 daily, spironolactone 25 mg daily, Lasix 20 mg daily  We will obtain a BMP today  -We may consider starting SGLT2 inhibitors for her HFrEF in the future (though cost and compliance may be a serious issue)  - If LVEF remains < 35% after 90 days of GDMT, will discuss referral to EP for possible device placement. However after extensive discussion with the patient and his wife today, patient became upset about the idea of receiving a defibrillator and was reluctant to proceed with a LifeVest and possible ICD placement in the near future.     2. Cocaine use:  I advised patient to avoid using cocaine again. Return in about 3 months (around 3/3/2022). I have spent 35 minutes of face to face time with the patient with more than 50% spent counseling and coordinating care. I have personally reviewed the reports and images of labs, radiological studies, cardiac studies including ECG's and telemetry, current and old medical records. The note was completed using EMR and Dragon dictation system. Every effort was made to ensure accuracy; however, inadvertent computerized transcription errors may be present. All questions and concerns were addressed to the patient/family. Alternatives to my treatment were discussed. I would like to thank you for providing me the opportunity to participate in the care of your patient. If you have any questions, please do not hesitate to contact me.      Bala Guevara MD, [facility]  [address] [address]  Main Office Phone: [phone]  Heart Failure Hotline: [phone]  Fax: [phone]

## 2022-09-22 ENCOUNTER — HOSPITAL ENCOUNTER (OUTPATIENT)
Dept: MAMMOGRAPHY | Age: 62
Discharge: HOME | End: 2022-09-22
Payer: COMMERCIAL

## 2022-09-22 ENCOUNTER — HOSPITAL ENCOUNTER (OUTPATIENT)
Dept: ULTRASOUND IMAGING | Age: 62
Discharge: HOME | End: 2022-09-22
Payer: COMMERCIAL

## 2022-09-22 VITALS — HEIGHT: 67 IN | WEIGHT: 160 LBS | BODY MASS INDEX: 25.11 KG/M2

## 2022-09-22 DIAGNOSIS — N64.4 MASTODYNIA: ICD-10-CM

## 2022-09-22 DIAGNOSIS — N63.0 BREAST LUMP: ICD-10-CM

## 2022-09-22 PROCEDURE — 77066 DX MAMMO INCL CAD BI: CPT

## 2022-11-21 ENCOUNTER — HOSPITAL ENCOUNTER (OUTPATIENT)
Age: 62
Setting detail: OUTPATIENT SURGERY
Discharge: HOME | End: 2022-11-21
Attending: INTERNAL MEDICINE | Admitting: INTERNAL MEDICINE
Payer: COMMERCIAL

## 2022-11-21 VITALS
SYSTOLIC BLOOD PRESSURE: 128 MMHG | TEMPERATURE: 97.2 F | RESPIRATION RATE: 18 BRPM | OXYGEN SATURATION: 95 % | WEIGHT: 163 LBS | BODY MASS INDEX: 24.71 KG/M2 | HEART RATE: 93 BPM | DIASTOLIC BLOOD PRESSURE: 95 MMHG | HEIGHT: 68 IN

## 2022-11-21 DIAGNOSIS — Z86.010 PERSONAL HISTORY OF COLONIC POLYPS: ICD-10-CM

## 2022-11-21 PROCEDURE — 2709999900 HC NON-CHARGEABLE SUPPLY: Performed by: INTERNAL MEDICINE

## 2022-11-21 PROCEDURE — 3609010600 HC COLONOSCOPY POLYPECTOMY SNARE/COLD BIOPSY: Performed by: INTERNAL MEDICINE

## 2022-11-21 PROCEDURE — 7100000010 HC PHASE II RECOVERY - FIRST 15 MIN: Performed by: INTERNAL MEDICINE

## 2022-11-21 PROCEDURE — 88305 TISSUE EXAM BY PATHOLOGIST: CPT

## 2022-11-21 PROCEDURE — 6360000002 HC RX W HCPCS: Performed by: INTERNAL MEDICINE

## 2022-11-21 PROCEDURE — 2580000003 HC RX 258: Performed by: INTERNAL MEDICINE

## 2022-11-21 PROCEDURE — 7100000011 HC PHASE II RECOVERY - ADDTL 15 MIN: Performed by: INTERNAL MEDICINE

## 2022-11-21 RX ORDER — MIDAZOLAM HYDROCHLORIDE 1 MG/ML
INJECTION INTRAMUSCULAR; INTRAVENOUS PRN
Status: DISCONTINUED | OUTPATIENT
Start: 2022-11-21 | End: 2022-11-21 | Stop reason: ALTCHOICE

## 2022-11-21 RX ORDER — FENTANYL CITRATE 50 UG/ML
INJECTION, SOLUTION INTRAMUSCULAR; INTRAVENOUS PRN
Status: DISCONTINUED | OUTPATIENT
Start: 2022-11-21 | End: 2022-11-21 | Stop reason: ALTCHOICE

## 2022-11-21 RX ORDER — SODIUM CHLORIDE, SODIUM LACTATE, POTASSIUM CHLORIDE, CALCIUM CHLORIDE 600; 310; 30; 20 MG/100ML; MG/100ML; MG/100ML; MG/100ML
INJECTION, SOLUTION INTRAVENOUS CONTINUOUS
Status: DISCONTINUED | OUTPATIENT
Start: 2022-11-21 | End: 2022-11-21 | Stop reason: HOSPADM

## 2022-11-21 RX ADMIN — SODIUM CHLORIDE, POTASSIUM CHLORIDE, SODIUM LACTATE AND CALCIUM CHLORIDE: 600; 310; 30; 20 INJECTION, SOLUTION INTRAVENOUS at 13:05

## 2022-11-21 ASSESSMENT — PAIN SCALES - GENERAL
PAINLEVEL_OUTOF10: 0
PAINLEVEL_OUTOF10: 0

## 2022-11-21 ASSESSMENT — PAIN - FUNCTIONAL ASSESSMENT: PAIN_FUNCTIONAL_ASSESSMENT: 0-10

## 2022-11-21 NOTE — DISCHARGE INSTRUCTIONS
ENDOSCOPY DISCHARGE INSTRUCTIONS:    Call the physician that did your procedure for any questions or concerns:           DR. Graves Para:  [phone]               ACTIVITY:    There are potential side effects to the medications used for sedation and anesthesia during your procedure. These include:  Dizziness or light-headedness, confusion or memory loss, delayed reaction times, loss of coordination, nausea and vomiting. Because of your increased risk for injury, we ask that you observe the following precautions: For the next 24 hours,  DO NOT operate an automobile, bicycle, motorcycle, , power tools or large equipment of any kind. Do not drink alcohol, sign any legal documents or make any legal decisions for 24 hours. Do not bend your head over lower than your heart. DO sit on the side of bed/couch awhile before getting up. Plan on bedrest or quiet relaxation today. You may resume normal activities in 24 hours. DIET:    Your first meal today should be light, avoiding spicy and fatty foods. If you tolerate this first meal,  then you may advance to your regular diet unless otherwise advised by your physician. NORMAL SYMPTOMS:  -Sore throat if youve had an EGD  -Gaseous discomfort    NOTIFY YOUR PHYSICIAN IF THESE SYMPTOMS OCCUR:  1. Fever (greater than 100)  5. Increased abdominal bloating  2. Severe pain    6. Excessive bleeding  3. Nausea and vomiting  7. Chest pain                                                                    4. Chills    8. Shortness of breath      ADDITIONAL INSTRUCTIONS:    Biopsy results:  WILL CALL YOU IN 1 WEEK WITH BIOPSY RESULTS. Educational Information:    Follow up appointment:                               Please review these discharge instructions this evening or tomorrow for   information you may have forgotten. We want to thank you for choosing the [facility] as your health care provider.  We always strive to provide you with excellent care while you are here. You may receive a survey in the mail regarding your care. We would appreciate you taking a few minutes of your time to complete this survey.  Again, thank you for choosing the [facility]

## 2022-11-21 NOTE — FLOWSHEET NOTE
Ambulatory Surgery/Procedure Discharge Note    Vitals:    11/21/22 1608   BP: (!) 128/95   Pulse: 93   Resp: 18   Temp: 97.2 °F (36.2 °C)   SpO2: 95%     Pt meets discharge criteria per All score. In: 200 [P.O.:200]  Out: - Voided x 1    Restroom use offered before discharge. Yes    Pain assessment:  none - sleeping off and on  Pain Level: 0  Pt and S.O./family states \"ready to go home\". Pt alert and oriented x4. IV removed. Denies N/V or pain. Discharge instructions given to pt and neighbor, Erna Courts by phone with pt permission. Pt and neighbor, Erna Courts verbalized understanding of all instructions. Left with all belongings and discharge instructions. Awaiting medical transport to arrive. Patient discharged to home/self care. Patient discharged via wheel chair by transporter.       11/21/2022 4:55 PM

## 2022-11-21 NOTE — PROGRESS NOTES
Wife called back and stated she called an UBER to  pt. Explained to Danish Arce patients wife that it was against hospital policy to let patients leave in an [address]. Danish Arce requested to speak with a manager. Gave Esthela's contact information to manager [place].

## 2022-11-21 NOTE — FLOWSHEET NOTE
Pt alert and oriented. IV started. LR infusing. Notified Endo procedure room that patient wants to talk to Dr. Kemal Castaneda before starting procedure. Endo nurse Alessandro Hernandez states he will let Dr. Kemal Castaneda know.

## 2022-11-21 NOTE — H&P
History and Physical / Pre-Sedation Assessment    Srikanth Regan is a 62 y.o. male who presents today for colonoscopy procedure. PMHx:    Past Medical History:   Diagnosis Date    Systolic CHF ([facility] [address]        Medications:    Prior to Admission medications    Medication Sig Start Date End Date Taking? Authorizing Provider   losartan (COZAAR) 25 MG tablet Take 1 tablet by mouth daily 12/3/21   Xena Loaiza MD   carvedilol (COREG) 3.125 MG tablet Take 1 tablet by mouth 2 times daily (with meals) 12/3/21   Xena Loaiza MD   furosemide (LASIX) 20 MG tablet Take 1 tablet by mouth daily 12/3/21   Xena Loaiza MD   spironolactone (ALDACTONE) 25 MG tablet Take 1 tablet by mouth daily 12/3/21   Xena Loaiza MD       Allergies: No Known Allergies    PSHx:    Past Surgical History:   Procedure Laterality Date    COLONOSCOPY         Social Hx:    Social History     Socioeconomic History    Marital status: Single     Spouse name: Not on file    Number of children: Not on file    Years of education: Not on file    Highest education level: Not on file   Occupational History    Not on file   Tobacco Use    Smoking status: Every Day     Packs/day: 0.25     Types: Cigarettes    Smokeless tobacco: Never   Vaping Use    Vaping Use: Never used   Substance and Sexual Activity    Alcohol use: Not Currently     Comment: occ    Drug use: Yes     Types: Marijuana Shane Radon)    Sexual activity: Not on file   Other Topics Concern    Not on file   Social History Narrative    Not on file     Social Determinants of Health     Financial Resource Strain: Not on file   Food Insecurity: Not on file   Transportation Needs: Not on file   Physical Activity: Not on file   Stress: Not on file   Social Connections: Not on file   Intimate Partner Violence: Not on file   Housing Stability: Not on file       Family Hx: History reviewed. No pertinent family history.     Physical Exam:  Vital Signs: /88   Pulse 87   Temp 97.4 °F (36.3 °C) (Temporal)   Resp 16   Ht 5' 7.5\" (1.715 m)   Wt 163 lb (73.9 kg)   SpO2 99%   BMI 25.15 kg/m²    Pulmonary: Normal  Cardiac: Normal  Abdomen: Normal    Pre-Procedure Assessment / Plan:  ASA Classification: Class 2 - A normal healthy patient with mild systemic disease  Level of Sedation Plan: Moderate sedation   Mallampati Score: II (soft palate, uvula, fauces visible)  Post Procedure plan: Return to same level of care    Colonoscopy Interval History:  3 or more years since last colonoscopy, Less than 3 years since the patient's last colonoscopy due to medical reasons, and Less than 3 years since the patient's last colonoscopy due to system reasons    Medical Reason for Colonoscopy before 3 years last colonoscopy incomplete, last colonoscopy had inadequate prep, piecemeal removal of adenomas, and last colonoscopy found greater than 10 adenomas  System Reasons for Colonoscopy before 3 years:   previous colonoscopy report unavailable or unable to locate    I assessed the patient and find that the patient is in satisfactory condition to proceed with the planned procedure and sedation plan. Risks/benefits/alternatives of procedure discussed with patient and any present family members. Risks including, but not limited to: bleeding, perforation, post polypectomy syndrome, splenic injury, need for additional procedures or surgery, risks of anesthesia. Patient understands it is their responsibility to call office for pathology results if they do not hear from my office within 1-2 weeks. All questions answered.     Jeremy Caicedo MD  11/21/2022

## 2022-11-23 ENCOUNTER — OFFICE VISIT (OUTPATIENT)
Dept: CARDIOLOGY CLINIC | Age: 62
End: 2022-11-23

## 2022-11-23 VITALS
BODY MASS INDEX: 25.18 KG/M2 | SYSTOLIC BLOOD PRESSURE: 130 MMHG | WEIGHT: 163.2 LBS | DIASTOLIC BLOOD PRESSURE: 80 MMHG | HEART RATE: 79 BPM

## 2022-11-23 DIAGNOSIS — I50.20 HFREF (HEART FAILURE WITH REDUCED EJECTION FRACTION) (HCC): Primary | ICD-10-CM

## 2022-11-23 PROCEDURE — 99999 PR OFFICE/OUTPT VISIT,PROCEDURE ONLY: CPT | Performed by: INTERNAL MEDICINE

## 2022-11-23 NOTE — PROGRESS NOTES
Patient came today for a follow up. As soon as I entered the room, he was shouting at his wife on the phone. I waited for a few minutes and then suggested we keep his wife on the phone so that she can participate in our discussion. Patient was upset about left nipple pain; had mammogram and was found to have gynecomastia, left more than right. He is on spironolactone for his severe heart failure, which is due to CAD and cocaine, and I was explaining to him that this is likely a side effect of the medication, hence I was planning to stop it, could consider eplerenone instead. Patient then starting becoming more belligerent and aggressive towards me, brought his chair very close to me and started shouting in a threatening way. I asked him to move the chair further and speak in a more respectful way but patient could not control his anger. He said he decided not to take any of his meds and this is the last time he is going to see me. I had to excuse myself from the room due to feeling threatened by his behavior. He will need to establish care with another provider, he will not be able to follow up with me in the future.

## 2024-01-30 RX ORDER — FUROSEMIDE 20 MG/1
20 TABLET ORAL DAILY
Qty: 90 TABLET | Refills: 11 | OUTPATIENT
Start: 2024-01-30

## 2024-01-30 RX ORDER — LOSARTAN POTASSIUM 25 MG/1
25 TABLET ORAL DAILY
Qty: 90 TABLET | Refills: 11 | OUTPATIENT
Start: 2024-01-30

## 2024-01-30 RX ORDER — SPIRONOLACTONE 25 MG/1
25 TABLET ORAL DAILY
Qty: 90 TABLET | Refills: 11 | OUTPATIENT
Start: 2024-01-30

## 2024-01-30 RX ORDER — CARVEDILOL 3.12 MG/1
3.12 TABLET ORAL 2 TIMES DAILY WITH MEALS
Qty: 180 TABLET | Refills: 11 | OUTPATIENT
Start: 2024-01-30

## 2025-04-24 NOTE — PROGRESS NOTES
4 Eyes Admission Assessment     I agree as the admission nurse that 2 RN's have performed a thorough Head to Toe Skin Assessment on the patient. ALL assessment sites listed below have been assessed on admission. Areas assessed by both nurses:   [x]   Head, Face, and Ears   [x]   Shoulders, Back, and Chest  [x]   Arms, Elbows, and Hands   [x]   Coccyx, Sacrum, and Ischium  [x]   Legs, Feet, and Heels        Does the Patient have Skin Breakdown?   No         Kirt Prevention initiated:  N/A  Wound Care Orders initiated:  N/A      WOC nurse consulted for Pressure Injury (Stage 3,4, Unstageable, DTI, NWPT, and Complex wounds) or Kirt score 18 or lower:  No    Nurse 1 eSignature: Electronically signed by Bj Chase RN on 8/26/21 at 6:11 PM EDT    **SHARE this note so that the co-signing nurse is able to place an eSignature**    Nurse 2 eSignature: Electronically signed by Lisa Harry RN on 8/26/21 at 6:13 PM EDT 50

## (undated) DEVICE — FORCEPS BX L240CM JAW DIA2.4MM ORNG L CAP W/ NDL DISP RAD

## (undated) DEVICE — CANNULA SAMP CO2 AD GRN 7FT CO2 AND 7FT O2 TBNG UNIV CONN